# Patient Record
Sex: FEMALE | Race: BLACK OR AFRICAN AMERICAN | Employment: OTHER | URBAN - METROPOLITAN AREA
[De-identification: names, ages, dates, MRNs, and addresses within clinical notes are randomized per-mention and may not be internally consistent; named-entity substitution may affect disease eponyms.]

---

## 2017-02-02 ENCOUNTER — DOCUMENTATION ONLY (OUTPATIENT)
Dept: INTERNAL MEDICINE CLINIC | Age: 72
End: 2017-02-02

## 2017-05-08 ENCOUNTER — OFFICE VISIT (OUTPATIENT)
Dept: INTERNAL MEDICINE CLINIC | Age: 72
End: 2017-05-08

## 2017-05-08 ENCOUNTER — HOSPITAL ENCOUNTER (OUTPATIENT)
Dept: LAB | Age: 72
Discharge: HOME | End: 2017-05-08
Payer: MEDICARE

## 2017-05-08 VITALS
OXYGEN SATURATION: 89 % | RESPIRATION RATE: 14 BRPM | TEMPERATURE: 97.7 F | BODY MASS INDEX: 25.62 KG/M2 | HEIGHT: 62 IN | HEART RATE: 51 BPM | DIASTOLIC BLOOD PRESSURE: 51 MMHG | SYSTOLIC BLOOD PRESSURE: 104 MMHG | WEIGHT: 139.2 LBS

## 2017-05-08 DIAGNOSIS — Z00.00 MEDICARE ANNUAL WELLNESS VISIT, SUBSEQUENT: Primary | ICD-10-CM

## 2017-05-08 DIAGNOSIS — K21.9 GASTROESOPHAGEAL REFLUX DISEASE WITHOUT ESOPHAGITIS: ICD-10-CM

## 2017-05-08 DIAGNOSIS — Z13.39 SCREENING FOR ALCOHOLISM: ICD-10-CM

## 2017-05-08 DIAGNOSIS — C90.01 MULTIPLE MYELOMA IN REMISSION (HCC): Chronic | ICD-10-CM

## 2017-05-08 DIAGNOSIS — E78.9 LIPID DISORDER: ICD-10-CM

## 2017-05-08 DIAGNOSIS — Z12.11 SCREENING FOR COLON CANCER: ICD-10-CM

## 2017-05-08 PROCEDURE — 80053 COMPREHEN METABOLIC PANEL: CPT

## 2017-05-08 PROCEDURE — 80061 LIPID PANEL: CPT

## 2017-05-08 PROCEDURE — 36415 COLL VENOUS BLD VENIPUNCTURE: CPT

## 2017-05-08 RX ORDER — HYDROGEN PEROXIDE 3 %
SOLUTION, NON-ORAL MISCELLANEOUS
Refills: 11 | COMMUNITY
Start: 2017-03-30 | End: 2018-01-09

## 2017-05-08 NOTE — PROGRESS NOTES
HISTORY OF PRESENT ILLNESS  Stefania Estrella is a 80 y.o. female. HPI     Multiple Myeloma- pt states she was not able to handle Revlimid 15 mg and her dose was decreased to 10 mg and she was not able to tolerate this either. Pt is now taking Revlimid 5 mg- she does a cycle where she takes Revlimid for a few weeks and then holds it for a week. Reports her neutrophil count is down now and she is doing a cycle- she will recheck her neutrophil count before she starts another cycle. Pt states they want her neutrophil count at 50. Pt saw Dr. Krish Constantino on 5/4/17. She will follow up in 10/2016 at [facility] with Dr. Ross De Jesus for immunizations. Pt states she has to get a lot of vaccines at the same time and this knocks her out. Pt will get the flu shot prior to this. Reports she has had pain and then blood vessels pop in her eyes and she thinks this may be correlated with Revlimid. Reports heart rate is low due to Revlimid and this medication makes her tired. Reports at her last follow up with Dr. Ross De Jesus she was told there was no trace of multiple myeloma and per  Dr. Krish Constantino said this as well. GERD- she continues to take Nexium. Pt used to go to [place] for colonoscopies although she does not feel up to this trip and requests name of GI. She worries about germs with being in a hotel room. Mood- reports her  has seen Donna Camara twice and she went once. Pt states  does not communicate and this bothers her. They are active with working in the yard. She denies stomach issues. Review of Systems   All other systems reviewed and are negative. Physical Exam   Constitutional: She is oriented to person, place, and time. She appears well-developed and well-nourished. HENT:   Head: Normocephalic and atraumatic.    Right Ear: External ear normal.   Left Ear: External ear normal.   Nose: Nose normal.   Mouth/Throat: Oropharynx is clear and moist.   Eyes: Conjunctivae and EOM are normal.   Neck: Normal range of motion. Neck supple. Carotid bruit is not present. No thyroid mass and no thyromegaly present. Cardiovascular: Normal rate, regular rhythm, S1 normal, S2 normal, normal heart sounds and intact distal pulses. Pulmonary/Chest: Effort normal and breath sounds normal.   Abdominal: Soft. Normal appearance and bowel sounds are normal. There is no hepatosplenomegaly. There is no tenderness. Musculoskeletal: Normal range of motion. Neurological: She is alert and oriented to person, place, and time. She has normal strength. No cranial nerve deficit or sensory deficit. Coordination normal.   Skin: Skin is warm, dry and intact. No abrasion and no rash noted. Psychiatric: She has a normal mood and affect. Her behavior is normal. Judgment and thought content normal.   Nursing note and vitals reviewed. ASSESSMENT and Jean-Pierre Noguera was seen today for annual wellness visit. Diagnoses and all orders for this visit:    Medicare annual wellness visit, subsequent    Multiple myeloma in remission ([facility] [address]  Stable- continue taking Revlimid and followed by Dr. Adalid Carter. Gastroesophageal reflux disease without esophagitis  Stable- Continue current medications. Screening for alcoholism    Lipid disorder  -     METABOLIC PANEL, COMPREHENSIVE  -     LIPID PANEL    Screening for colon cancer  I provided pt with Dr. Sherrie Figueroa contact information.   -     REFERRAL TO GASTROENTEROLOGY    She has labs with Dr. Adalid Carter. lab results and schedule of future lab studies reviewed with patient  reviewed diet, exercise and weight control  reviewed medications and side effects in detail     Written by Vanna Abbott, as dictated by Radha Pratt MD.     Current diagnosis and concerns discussed with pt at length. Understands risks and benefits or current treatment plan and medications and accepts the treatment and medication with any possible risks. Pt asks appropriate questions which were answered.  Pt instructed to call with any concerns or problems.

## 2017-05-08 NOTE — ACP (ADVANCE CARE PLANNING)
Patient states that completed AMD at home. I encouraged patient to bring a copy to the office for scanning into the medical record. Patient verbalized understanding.

## 2017-05-08 NOTE — PROGRESS NOTES
This is a Subsequent Medicare Annual Wellness Visit providing Personalized Prevention Plan Services (PPPS) (Performed 12 months after initial AWV and PPPS )    I have reviewed the patient's medical history in detail and updated the computerized patient record. History     Past Medical History:   Diagnosis Date    GERD (gastroesophageal reflux disease) 6/2/2009    Multiple myeloma     Multiple myeloma in relapse ([facility] [address] 6/2/2009    Multiple myeloma in remission ([facility] [address] 3/17/2011    Multiple myeloma in remission ([facility] [address] 3/17/2011    Osteoarthritis     Tendonitis       Past Surgical History:   Procedure Laterality Date    HX GYN       Current Outpatient Prescriptions   Medication Sig Dispense Refill    esomeprazole (NEXIUM) 20 mg capsule TAKE ONE CAPSULE TWO TIMES A DAY  11    REVLIMID 25 mg cap 25 mg once over twenty-four (24) hours. Once daily for 14 doses and stop for 1 week and restart cycle again. Total of 5 cycles.  acyclovir (ZOVIRAX) 400 mg tablet 400 mg two (2) times a day. 5    estradiol (ESTRACE) 0.01 % (0.1 mg/gram) vaginal cream Insert 2 g into vagina daily.  aspirin delayed-release 81 mg tablet Take 162 mg by mouth daily.  ERGOCALCIFEROL, VITAMIN D2, (VITAMIN D PO) Take 400 Units by mouth daily.  CALCIUM CARBONATE/VITAMIN D3 (CALCIUM + D PO) Take 1,200 mg by mouth daily.  omeprazole (PRILOSEC) 20 mg capsule TAKE ONE CAPSULE BY MOUTH TWICE A DAY 60 Cap 4    omeprazole (PRILOSEC) 20 mg capsule TAKE ONE CAPSULE BY MOUTH TWICE A DAY 60 Cap 4    omeprazole (PRILOSEC) 20 mg capsule Take 1 Cap by mouth two (2) times a day for 30 days. 60 Cap 3    ranitidine (ZANTAC) 150 mg tablet Take 1 Tab by mouth two (2) times a day. 20 Tab 0    dexamethasone (DECADRON) 4 mg tablet 40 mg every seven (7) days. Once weekly, taking on Monday's. 5    ondansetron hcl (ZOFRAN) 8 mg tablet as needed. 1    BORTEZOMIB (VELCADE IJ) 2.3 mg by Injection route every seven (7) days.  Taking every Monday.  COQ10, LIPOSOMAL UBIQUINOL, PO Take 100 mg by mouth daily.  B.infantis-B.ani-B.long-B.bifi (PROBIOTIC 4X) 10-15 mg TbEC Take  by mouth daily.  PV W-O BIBI/FERROUS FUMARATE/FA (M-VIT PO) Take  by mouth.  OMEPRAZOLE/SODIUM BICARBONATE (ZEGERID PO) Take  by mouth daily. Allergies   Allergen Reactions    Ciprofloxacin Other (comments)     Tendinitis.  Keflex [Cephalexin] Rash    Levaquin [Levofloxacin] Other (comments)     Tendinitis. Family History   Problem Relation Age of Onset   [facility] Cancer Mother     Hypertension Sister     Thyroid Disease Sister     Hypertension Brother      Social History   Substance Use Topics    Smoking status: Never Smoker    Smokeless tobacco: Never Used    Alcohol use No     Patient Active Problem List   Diagnosis Code    GERD (gastroesophageal reflux disease) K21.9    Multiple myeloma in remission ([facility] [address] C90.01    Advanced care planning/counseling discussion Z71.89       Depression Risk Factor Screening:     PHQ over the last two weeks 12/8/2015   Little interest or pleasure in doing things Not at all   Feeling down, depressed or hopeless Several days   Total Score PHQ 2 1     Alcohol Risk Factor Screening: On any occasion during the past 3 months, have you had more than 3 drinks containing alcohol? No    Do you average more than 7 drinks per week? No      Functional Ability and Level of Safety:     Hearing Loss   normal-to-mild    Activities of Daily Living   Self-care. Requires assistance with: no ADLs    Fall Risk     Fall Risk Assessment, last 12 mths 12/8/2015   Able to walk? Yes   Fall in past 12 months? No     Abuse Screen   Patient is not abused    Review of Systems   A comprehensive review of systems was negative except for that written in the HPI.     Physical Examination     Evaluation of Cognitive Function:  Mood/affect:  neutral  Appearance: age appropriate  Family member/caregiver input: has been doing well        Patient Care Team:  Juan Linda MD as PCP - Elliot Marquez MD (Hematology and Oncology)  Paradise Mccormick MD (Ophthalmology)    Advice/Referrals/Counseling   Education and counseling provided:  Are appropriate based on today's review and evaluation  End-of-Life planning (with patient's consent)      Assessment/Plan   Barb Molina was seen today for annual wellness visit. Diagnoses and all orders for this visit:    Medicare annual wellness visit, subsequent    Multiple myeloma in remission ([facility] [address]    Gastroesophageal reflux disease without esophagitis    Screening for alcoholism    Lipid disorder  -     METABOLIC PANEL, COMPREHENSIVE  -     LIPID PANEL    Screening for colon cancer  -     REFERRAL TO GASTROENTEROLOGY      lab results and schedule of future lab studies reviewed with patient  reviewed diet, exercise and weight control  cardiovascular risk and specific lipid/LDL goals reviewed  reviewed medications and side effects in detail.

## 2017-05-08 NOTE — MR AVS SNAPSHOT
Visit Information Date & Time Provider Department Dept. Phone Encounter #  
 5/8/2017  7:45 AM Joanne Gee MD [facility] [address] [id number] Upcoming Health Maintenance Date Due Pneumococcal 65+ High/Highest Risk (2 of 2 - PPSV23) 7/3/2017 INFLUENZA AGE 9 TO ADULT 8/1/2017 GLAUCOMA SCREENING Q2Y 12/15/2017 MEDICARE YEARLY EXAM 5/9/2018 BREAST CANCER SCRN MAMMOGRAM 12/27/2018 COLONOSCOPY 5/2/2022 DTaP/Tdap/Td series (2 - Td) 5/8/2027 Allergies as of 5/8/2017  Review Complete On: 5/8/2017 By: Radha Landeros LPN Severity Noted Reaction Type Reactions Ciprofloxacin  01/21/2015    Other (comments) Tendinitis. Keflex [Cephalexin]  02/02/2009    Rash Levaquin [Levofloxacin]  01/21/2015    Other (comments) Tendinitis. Current Immunizations  Reviewed on 5/8/2017 Name Date Influenza Vaccine 10/5/2015, 10/7/2013 Influenza Vaccine Whole 9/26/2011 Reviewed by Joanne Gee MD on 5/8/2017 at  7:59 AM  
You Were Diagnosed With   
  
 Codes Comments Medicare annual wellness visit, subsequent    -  Primary ICD-10-CM: Z00.00 ICD-9-CM: V70.0 Multiple myeloma in remission ([facility] [address]     ICD-10-CM: C90.01 
ICD-9-CM: 203.01 Gastroesophageal reflux disease without esophagitis     ICD-10-CM: K21.9 ICD-9-CM: 530.81 Screening for alcoholism     ICD-10-CM: Z13.89 ICD-9-CM: V79.1 Lipid disorder     ICD-10-CM: E78.9 ICD-9-CM: 272.9 Screening for colon cancer     ICD-10-CM: Z12.11 ICD-9-CM: V76.51 Vitals BP Pulse Temp Resp Height(growth percentile) Weight(growth percentile) 104/51 (BP 1 Location: Left arm, BP Patient Position: Sitting) (!) 51 97.7 °F (36.5 °C) (Oral) 14 5' 2\" (1.575 m) 139 lb 3.2 oz (63.1 kg) SpO2 BMI OB Status Smoking Status (!) 89% 25.46 kg/m2 Hysterectomy Never Smoker Vitals History BMI and BSA Data Body Mass Index Body Surface Area  
 25.46 kg/m 2 1.66 m 2 Preferred Pharmacy Pharmacy Name Phone [address] [facility], [address] [facility] Gauri Romero [phone] Your Updated Medication List  
  
   
This list is accurate as of: 5/8/17 11:59 PM.  Always use your most recent med list.  
  
  
  
  
 acyclovir 400 mg tablet Commonly known as:  ZOVIRAX  
400 mg two (2) times a day. aspirin delayed-release 81 mg tablet Take 162 mg by mouth daily. CALCIUM + D PO Take 1,200 mg by mouth daily. esomeprazole 20 mg capsule Commonly known as:  NEXIUM  
TAKE ONE CAPSULE TWO TIMES A DAY  
  
 estradiol 0.01 % (0.1 mg/gram) vaginal cream  
Commonly known as:  ESTRACE Insert 2 g into vagina daily. omeprazole 20 mg capsule Commonly known as:  PRILOSEC Take 1 Cap by mouth two (2) times a day for 30 days. REVLIMID 25 mg Cap Generic drug:  lenalidomide 25 mg once over twenty-four (24) hours. Once daily for 14 doses and stop for 1 week and restart cycle again. Total of 5 cycles. VITAMIN D2 PO Take 400 Units by mouth daily. We Performed the Following CVD REPORT [[id number] Custom] LIPID PANEL [99305 CPT(R)] METABOLIC PANEL, COMPREHENSIVE [33441 CPT(R)] REFERRAL TO GASTROENTEROLOGY [QJU36 Custom] Referral Information Referral ID Referred By Referred To  
  
 [id number] Santiago PRESTON   
   [address] [place] [address] [address] [place] [address], [address] [facility] Visits Status Start Date End Date 1 New Request 5/8/17 5/8/18 If your referral has a status of pending review or denied, additional information will be sent to support the outcome of this decision. Introducing [facility]! Dear Debora Chowdary: Thank you for requesting a [employer] account. Our records indicate that you already have an active [employer] account.   You can access your account anytime at [email or web address] Did you know that you can access your hospital and ER discharge instructions at any time in [employer]? You can also review all of your test results from your hospital stay or ER visit. Additional Information If you have questions, please visit the Frequently Asked Questions section of the [employer] website at [email or web address] Remember, [employer] is NOT to be used for urgent needs. For medical emergencies, dial 911. Now available from your iPhone and Android! Please provide this summary of care documentation to your next provider. Your primary care clinician is listed as Rachel Owens. If you have any questions after today's visit, please call [phone].

## 2017-05-09 LAB
ALBUMIN SERPL-MCNC: 4.2 G/DL (ref 3.5–4.8)
ALBUMIN/GLOB SERPL: 1.8 {RATIO} (ref 1.2–2.2)
ALP SERPL-CCNC: 37 IU/L (ref 39–117)
ALT SERPL-CCNC: 28 IU/L (ref 0–32)
AST SERPL-CCNC: 26 IU/L (ref 0–40)
BILIRUB SERPL-MCNC: 0.5 MG/DL (ref 0–1.2)
BUN SERPL-MCNC: 13 MG/DL (ref 8–27)
BUN/CREAT SERPL: 20 (ref 12–28)
CALCIUM SERPL-MCNC: 9.7 MG/DL (ref 8.7–10.3)
CHLORIDE SERPL-SCNC: 100 MMOL/L (ref 96–106)
CHOLEST SERPL-MCNC: 195 MG/DL (ref 100–199)
CO2 SERPL-SCNC: 29 MMOL/L (ref 18–29)
CREAT SERPL-MCNC: 0.64 MG/DL (ref 0.57–1)
GLOBULIN SER CALC-MCNC: 2.4 G/DL (ref 1.5–4.5)
GLUCOSE SERPL-MCNC: 74 MG/DL (ref 65–99)
HDLC SERPL-MCNC: 95 MG/DL
INTERPRETATION, 910389: NORMAL
LDLC SERPL CALC-MCNC: 84 MG/DL (ref 0–99)
POTASSIUM SERPL-SCNC: 4.3 MMOL/L (ref 3.5–5.2)
PROT SERPL-MCNC: 6.6 G/DL (ref 6–8.5)
SODIUM SERPL-SCNC: 143 MMOL/L (ref 134–144)
TRIGL SERPL-MCNC: 82 MG/DL (ref 0–149)
VLDLC SERPL CALC-MCNC: 16 MG/DL (ref 5–40)

## 2017-10-06 ENCOUNTER — OFFICE VISIT (OUTPATIENT)
Dept: INTERNAL MEDICINE CLINIC | Age: 72
End: 2017-10-06

## 2017-10-06 VITALS
SYSTOLIC BLOOD PRESSURE: 108 MMHG | OXYGEN SATURATION: 98 % | BODY MASS INDEX: 26.68 KG/M2 | TEMPERATURE: 98 F | HEIGHT: 62 IN | HEART RATE: 53 BPM | RESPIRATION RATE: 18 BRPM | DIASTOLIC BLOOD PRESSURE: 45 MMHG | WEIGHT: 145 LBS

## 2017-10-06 DIAGNOSIS — N64.4 BREAST PAIN, RIGHT: Primary | ICD-10-CM

## 2017-10-06 DIAGNOSIS — C90.01 MULTIPLE MYELOMA IN REMISSION (HCC): Chronic | ICD-10-CM

## 2017-10-06 RX ORDER — PYRIDOXINE HCL (VITAMIN B6) 100 MG
100 TABLET ORAL 2 TIMES DAILY
COMMUNITY

## 2017-10-06 NOTE — PROGRESS NOTES
Pt here c/o intermittent bilateral breast pain x 1 month. Reports pain is worse in right breast.  States she has a stinging sensation as well.

## 2017-10-06 NOTE — PROGRESS NOTES
HISTORY OF PRESENT ILLNESS  Nita Lee is a 80 y.o. female. HPI   Patient reports breast pain in both breasts. She believes it may be a problem with her port. Patient states it feels like a burning pain around nipple area. MM- she is taking the revalid consistently and going to be seeing  on Monday and going to [facility] in two weeks  Review of Systems   All other systems reviewed and are negative. Physical Exam   Constitutional: She is oriented to person, place, and time. She appears well-developed and well-nourished. HENT:   Head: Normocephalic and atraumatic. Right Ear: External ear normal.   Left Ear: External ear normal.   Nose: Nose normal.   Mouth/Throat: Oropharynx is clear and moist.   Eyes: Conjunctivae and EOM are normal.   Neck: Normal range of motion. Neck supple. Carotid bruit is not present. No thyroid mass and no thyromegaly present. Cardiovascular: Normal rate, regular rhythm, S1 normal, S2 normal, normal heart sounds and intact distal pulses. Pulmonary/Chest: Effort normal and breath sounds normal.   Abdominal: Soft. Normal appearance and bowel sounds are normal. There is no hepatosplenomegaly. There is no tenderness. Musculoskeletal: Normal range of motion. Neurological: She is alert and oriented to person, place, and time. She has normal strength. No cranial nerve deficit or sensory deficit. Coordination normal.   Skin: Skin is warm, dry and intact. No abrasion and no rash noted. Psychiatric: She has a normal mood and affect. Her behavior is normal. Judgment and thought content normal.   Nursing note and vitals reviewed. ASSESSMENT and PLAN  Diagnoses and all orders for this visit:    1. Breast pain, right  Do not suspect pain related to port, because lack of swelling or redness. Do not feel masses or nodes and no drainage. May be reaction to medications. Patient will follow up with Dr. All Allen and get her opinion.  If she thinks she needs mammogram we will do it!    2. Multiple myeloma in remission ([facility] [address]  Follow up with Dr. Anju Huerta and U. Continue on current medications. lab results and schedule of future lab studies reviewed with patient  reviewed diet, exercise and weight control    Written by Guillermo Macias, as dictated by Armen Ramirez MD.     Current diagnosis and concerns discussed with pt at length. Understands risks and benefits or current treatment plan and medications and accepts the treatment and medication with any possible risks. Pt asks appropriate questions which were answered. Pt instructed to call with any concerns or problems.

## 2017-10-11 ENCOUNTER — TELEPHONE (OUTPATIENT)
Dept: INTERNAL MEDICINE CLINIC | Age: 72
End: 2017-10-11

## 2017-10-11 NOTE — TELEPHONE ENCOUNTER
Patient  Is having a colonoscopy and needs records faxed to Dr. Marybeth Runner office.   [phone]

## 2018-01-08 ENCOUNTER — HOSPITAL ENCOUNTER (OUTPATIENT)
Dept: MAMMOGRAPHY | Age: 73
Discharge: HOME | End: 2018-01-08
Attending: INTERNAL MEDICINE
Payer: MEDICARE

## 2018-01-08 DIAGNOSIS — Z12.39 SCREENING BREAST EXAMINATION: ICD-10-CM

## 2018-01-08 PROCEDURE — 77067 SCR MAMMO BI INCL CAD: CPT

## 2018-01-09 RX ORDER — GUAIFENESIN 100 MG/5ML
81 LIQUID (ML) ORAL DAILY
COMMUNITY

## 2018-01-12 ENCOUNTER — ANESTHESIA EVENT (OUTPATIENT)
Dept: ENDOSCOPY | Age: 73
End: 2018-01-12
Payer: MEDICARE

## 2018-01-12 ENCOUNTER — ANESTHESIA (OUTPATIENT)
Dept: ENDOSCOPY | Age: 73
End: 2018-01-12
Payer: MEDICARE

## 2018-01-12 ENCOUNTER — HOSPITAL ENCOUNTER (OUTPATIENT)
Age: 73
Setting detail: OUTPATIENT SURGERY
Discharge: HOME | End: 2018-01-12
Attending: INTERNAL MEDICINE | Admitting: INTERNAL MEDICINE
Payer: MEDICARE

## 2018-01-12 VITALS
RESPIRATION RATE: 18 BRPM | HEIGHT: 62 IN | BODY MASS INDEX: 26.68 KG/M2 | TEMPERATURE: 97.5 F | HEART RATE: 60 BPM | OXYGEN SATURATION: 100 % | SYSTOLIC BLOOD PRESSURE: 116 MMHG | WEIGHT: 145 LBS | DIASTOLIC BLOOD PRESSURE: 55 MMHG

## 2018-01-12 PROCEDURE — 74011250636 HC RX REV CODE- 250/636

## 2018-01-12 PROCEDURE — 76060000031 HC ANESTHESIA FIRST 0.5 HR: Performed by: INTERNAL MEDICINE

## 2018-01-12 PROCEDURE — 76040000019: Performed by: INTERNAL MEDICINE

## 2018-01-12 PROCEDURE — 74011250636 HC RX REV CODE- 250/636: Performed by: INTERNAL MEDICINE

## 2018-01-12 PROCEDURE — 74011000250 HC RX REV CODE- 250

## 2018-01-12 RX ORDER — ATROPINE SULFATE 0.1 MG/ML
0.4 INJECTION INTRAVENOUS
Status: DISCONTINUED | OUTPATIENT
Start: 2018-01-12 | End: 2018-01-12 | Stop reason: HOSPADM

## 2018-01-12 RX ORDER — PROPOFOL 10 MG/ML
INJECTION, EMULSION INTRAVENOUS
Status: DISCONTINUED | OUTPATIENT
Start: 2018-01-12 | End: 2018-01-12 | Stop reason: HOSPADM

## 2018-01-12 RX ORDER — FLUMAZENIL 0.1 MG/ML
0.2 INJECTION INTRAVENOUS
Status: DISCONTINUED | OUTPATIENT
Start: 2018-01-12 | End: 2018-01-12 | Stop reason: HOSPADM

## 2018-01-12 RX ORDER — EPINEPHRINE 0.1 MG/ML
1 INJECTION INTRACARDIAC; INTRAVENOUS
Status: DISCONTINUED | OUTPATIENT
Start: 2018-01-12 | End: 2018-01-12 | Stop reason: HOSPADM

## 2018-01-12 RX ORDER — SODIUM CHLORIDE 9 MG/ML
50 INJECTION, SOLUTION INTRAVENOUS CONTINUOUS
Status: DISCONTINUED | OUTPATIENT
Start: 2018-01-12 | End: 2018-01-12 | Stop reason: HOSPADM

## 2018-01-12 RX ORDER — LIDOCAINE HYDROCHLORIDE 20 MG/ML
INJECTION, SOLUTION EPIDURAL; INFILTRATION; INTRACAUDAL; PERINEURAL AS NEEDED
Status: DISCONTINUED | OUTPATIENT
Start: 2018-01-12 | End: 2018-01-12 | Stop reason: HOSPADM

## 2018-01-12 RX ORDER — PROPOFOL 10 MG/ML
INJECTION, EMULSION INTRAVENOUS AS NEEDED
Status: DISCONTINUED | OUTPATIENT
Start: 2018-01-12 | End: 2018-01-12 | Stop reason: HOSPADM

## 2018-01-12 RX ORDER — MIDAZOLAM HYDROCHLORIDE 1 MG/ML
.25-5 INJECTION, SOLUTION INTRAMUSCULAR; INTRAVENOUS
Status: DISCONTINUED | OUTPATIENT
Start: 2018-01-12 | End: 2018-01-12 | Stop reason: HOSPADM

## 2018-01-12 RX ORDER — HYDROGEN PEROXIDE 3 %
20 SOLUTION, NON-ORAL MISCELLANEOUS DAILY
COMMUNITY
End: 2019-05-15 | Stop reason: SDUPTHER

## 2018-01-12 RX ORDER — NALOXONE HYDROCHLORIDE 0.4 MG/ML
0.4 INJECTION, SOLUTION INTRAMUSCULAR; INTRAVENOUS; SUBCUTANEOUS
Status: DISCONTINUED | OUTPATIENT
Start: 2018-01-12 | End: 2018-01-12 | Stop reason: HOSPADM

## 2018-01-12 RX ORDER — DEXTROMETHORPHAN/PSEUDOEPHED 2.5-7.5/.8
1.2 DROPS ORAL
Status: DISCONTINUED | OUTPATIENT
Start: 2018-01-12 | End: 2018-01-12 | Stop reason: HOSPADM

## 2018-01-12 RX ADMIN — LIDOCAINE HYDROCHLORIDE 30 MG: 20 INJECTION, SOLUTION EPIDURAL; INFILTRATION; INTRACAUDAL; PERINEURAL at 08:28

## 2018-01-12 RX ADMIN — SODIUM CHLORIDE 50 ML/HR: 900 INJECTION, SOLUTION INTRAVENOUS at 07:13

## 2018-01-12 RX ADMIN — PROPOFOL 50 MG: 10 INJECTION, EMULSION INTRAVENOUS at 08:31

## 2018-01-12 RX ADMIN — PROPOFOL 100 MCG/KG/MIN: 10 INJECTION, EMULSION INTRAVENOUS at 08:27

## 2018-01-12 RX ADMIN — PROPOFOL 50 MG: 10 INJECTION, EMULSION INTRAVENOUS at 08:36

## 2018-01-12 RX ADMIN — PROPOFOL 120 MG: 10 INJECTION, EMULSION INTRAVENOUS at 08:28

## 2018-01-12 NOTE — PROCEDURES
Matt Munguia M.D.  [phone]           2018                EGD Operative Report  Ronnie Sparrow  :  1945  Zander Orozco Medical Record Number:  [id number]      Indication:  GERD     : Kurtis Chavez MD    Referring Provider:  Charlene Kearney MD      Anesthesia/Sedation:  MAC anesthesia    Airway assessment: No airway problems anticipated    Pre-Procedural Exam:      Airway: clear, no airway problems anticipated  Heart: RRR, without gallops or rubs  Lungs: clear bilaterally without wheezes, crackles, or rhonchi  Abdomen: soft, nontender, nondistended, bowel sounds present  Mental Status: awake, alert and oriented to person, place and time       Procedure Details     After infomed consent was obtained for the procedure, with all risks and benefits of procedure explained the patient was taken to the endoscopy suite and placed in the left lateral decubitus position. Following sequential administration of sedation as per above, the endoscope was inserted into the mouth and advanced under direct vision to second portion of the duodenum. A careful inspection was made as the gastroscope was withdrawn, including a retroflexed view of the proximal stomach; findings and interventions are described below. Findings:   Esophagus:normal  Stomach: normal   Duodenum/jejunum: normal    Therapies:  none    Specimens: none           Complications:   None; patient tolerated the procedure well. EBL:  None. Impression:    Upper endoscopy within normal.    Recommendations:    -Continue acid suppression. -GERD diet: avoid fried and fatty foods. peppermint, chocolate, alcohol, coffee, citrus fruits and juices, tomoato products; avoid lying down for 2 to 3 hours after eating.     Kutris Chavez MD

## 2018-01-12 NOTE — ANESTHESIA POSTPROCEDURE EVALUATION
Post-Anesthesia Evaluation and Assessment    Patient: Yudelka Lemus MRN: [id number]  SSN: [id number]    YOB: 1945  Age: 71 y.o. Sex: female       Cardiovascular Function/Vital Signs  Visit Vitals    /55    Pulse 60    Temp 36.4 °C (97.5 °F)    Resp 18    Ht 5' 2\" (1.575 m)    Wt 65.8 kg (145 lb)    SpO2 100%    Breastfeeding No    BMI 26.52 kg/m2       Patient is status post MAC anesthesia for Procedure(s):  COLONOSCOPY  ESOPHAGOGASTRODUODENOSCOPY (EGD). Nausea/Vomiting: None    Postoperative hydration reviewed and adequate. Pain:  Pain Scale 1: Numeric (0 - 10) (01/12/18 0911)  Pain Intensity 1: 0 (01/12/18 0911)   Managed    Neurological Status: At baseline    Mental Status and Level of Consciousness: Arousable    Pulmonary Status:   O2 Device: Room air (01/12/18 0911)   Adequate oxygenation and airway patent    Complications related to anesthesia: None    Post-anesthesia assessment completed.  No concerns    Signed By: Neo Adams MD     January 12, 2018

## 2018-01-12 NOTE — PROGRESS NOTES
Assumed care of patient from anesthesia. Endoscope was pre-cleaned at bedside immediately following procedure by Sania Solorzano. Patient held temporarily in procedure room due to limited RR space.

## 2018-01-12 NOTE — IP AVS SNAPSHOT
[address] 
[phone] Patient: Nayeli Carroll MRN: [id number] :1945 About your hospitalization You were admitted on:  2018 You last received care in the:  [facility] ENDOSCOPY You were discharged on:  2018 Why you were hospitalized Your primary diagnosis was:  Not on File Follow-up Information None Discharge Orders None A check [place] indicates which time of day the medication should be taken. My Medications CONTINUE taking these medications Instructions Each Dose to Equal  
 Morning Noon Evening Bedtime  
 aspirin 81 mg chewable tablet Your last dose was: Your next dose is: Take 81 mg by mouth daily. 81 mg CALCIUM + D PO Your last dose was: Your next dose is: Take 1,000 mg by mouth two (2) times a day. 1000 mg  
    
   
   
   
  
 esomeprazole 20 mg capsule Commonly known as:  Beverley Grounds Your last dose was: Your next dose is: Take 20 mg by mouth daily. Indications: gastroesophageal reflux disease 20 mg  
    
   
   
   
  
 estradiol 0.01 % (0.1 mg/gram) vaginal cream  
Commonly known as:  ESTRACE Your last dose was: Your next dose is: Insert 2 g into vagina daily. 2 g  
    
   
   
   
  
 omeprazole 20 mg capsule Commonly known as:  PRILOSEC Your last dose was: Your next dose is: Take 1 Cap by mouth two (2) times a day for 30 days. 20 mg REVLIMID 25 mg Cap Generic drug:  lenalidomide Your last dose was: Your next dose is:    
   
   
 5 mg once over twenty-four (24) hours. Once daily for 14 doses and stop for 1 week and restart cycle again. Total of 5 cycles. 5 mg VITAMIN B-6 100 mg tablet Generic drug:  pyridoxine (vitamin B6) Your last dose was: Your next dose is: Take 100 mg by mouth two (2) times a day. 100 mg Discharge Instructions [facility]. [facility] Elie Sahu M.D. 
[phone] COLON and EGD DISCHARGE INSTRUCTIONS 
 
2018 Clifton Verdugo :  1945 John Medical Record Number:  [id number] DISCOMFORT: 
Sore throat- throat lozenges or warm salt water gargle Redness at IV site- apply warm compress to area; if redness or soreness persist- contact your physician There may be a slight amount of blood passed from the rectum Gaseous discomfort- walking, belching will help relieve any discomfort You may not operate a vehicle for 12 hours You may not engage in an occupation involving machinery or appliances for rest of today You may not drink alcoholic beverages for at least 12 hours Avoid making any critical decisions for at least 24 hour DIET: 
 High fiber diet.  however -  remember your colon is empty and a heavy meal will produce gas. Avoid these foods:  vegetables, fried / greasy foods, carbonated drinks for today ACTIVITY: 
You may  resume your normal daily activities it is recommended that you spend the remainder of the day resting -  avoid any strenuous activity and driving. CALL M.D. ANY SIGN OF: Increasing pain, nausea, vomiting Abdominal distension (swelling) New increased bleeding (oral or rectal) Fever (chills) Pain in chest area Bloody discharge from nose or mouth Shortness of breath Follow-up Instructions: 
 Call Dr. Xiomy Verdin if any questions at [phone]. Results of procedure / biopsy in 7 to 10 days, we will call you with these results. Your endoscopy showed normal mucosa throughout. Your colonoscopy showed normal mucosa throughout the colon. ACO Transitions of Care Introducing [employer] [address] offers a voluntary care coordination program to provide high quality service and care to [facility] fee-for-service beneficiaries. Lupe Almonte was designed to help you enhance your health and well-being through the following services: ? Transitions of Care  support for individuals who are transitioning from one care setting to another (example: Hospital to home). ? Chronic and Complex Care Coordination  support for individuals and caregivers of those with serious or chronic illnesses or with more than one chronic (ongoing) condition and those who take a number of different medications. If you meet specific medical criteria, a [address] [place] [address] Rd may call you directly to coordinate your care with your primary care physician and your other care providers. For questions about the [facility] programs, please, contact your physicians office. For general questions or additional information about Accountable Care Organizations: 
Please visit www.medicare.gov/acos. html or call 1-800-MEDICARE (9-645.261.8439) TTY users should call 1-220.649.1334. Introducing [facility] & HEALTH SERVICES! Dear Fernie Mathews: Thank you for requesting a [email or web address] account. Our records indicate that you already have an active [email or web address] account. You can access your account anytime at [email or web address] Did you know that you can access your hospital and ER discharge instructions at any time in [email or web address]? You can also review all of your test results from your hospital stay or ER visit. Additional Information If you have questions, please visit the Frequently Asked Questions section of the [email or web address] website at [email or web address] Remember, [email or web address] is NOT to be used for urgent needs. For medical emergencies, dial 911. Now available from your iPhone and Android! Providers Seen During Your Hospitalization Provider Specialty Primary office phone aDvid Cohen MD Gastroenterology [phone] Your Primary Care Physician (PCP) Primary Care Physician Office Phone Office Sherry PERRY [address] [phone] You are allergic to the following Allergen Reactions Ciprofloxacin Other (comments) Tendonitis. Keflex (Cephalexin) Rash Levaquin (Levofloxacin) Other (comments) Tendinitis. Recent Documentation Height Weight Breastfeeding? BMI OB Status Smoking Status 1.575 m 65.8 kg No 26.52 kg/m2 Hysterectomy Never Smoker Emergency Contacts Name Discharge Info Relation Home Work Mobile Devon Dwyer DISCHARGE CAREGIVER [3] Spouse [3] [phone] Patient Belongings The following personal items are in your possession at time of discharge: 
  Dental Appliances: None  Visual Aid: Glasses, At bedside Please provide this summary of care documentation to your next provider. Signatures-by signing, you are acknowledging that this After Visit Summary has been reviewed with you and you have received a copy. Patient Signature:  ____________________________________________________________ Date:  ____________________________________________________________  
  
[facility] Provider Signature:  ____________________________________________________________ Date:  ____________________________________________________________

## 2018-01-12 NOTE — PROGRESS NOTES
Patient resting comfortably on stretcher HOB up VSS call bell within reach  at bedside awaiting MD for procedure will continue to monitor pt.

## 2018-01-12 NOTE — PROGRESS NOTES
Patient resting comfortably on stretcher, HOB up VSS,call bell within reach,  in waiting area and informed of procedure time and/or delays,awaiting procedure start, will continue to monitor pt.

## 2018-01-12 NOTE — ROUTINE PROCESS
Yudelka Allan  1945  [id number]    Situation:  Verbal report received from: Wade Delgado RN  Procedure: Procedure(s):  COLONOSCOPY  ESOPHAGOGASTRODUODENOSCOPY (EGD)    Background:    Preoperative diagnosis: REFLUX, LLQ PAIN  Postoperative diagnosis: Normal EGD Exam  Normal Colon Exam    :  Dr. Kaylie Singh  Assistant(s): Endoscopy Technician-1: Alba Zelaya  Endoscopy RN-1: Katlin Vale RN    Specimens: * No specimens in log *  H. Pylori  no    Assessment:  Intra-procedure medications     Anesthesia gave intra-procedure sedation and medications, see anesthesia flow sheet yes    Intravenous fluids: NS@ KVO     Vital signs stable     Abdominal assessment: round and soft     Recommendation:  Discharge patient per MD order  Family or Friend   Permission to share finding with family or friend yes    Endoscopy discharge instructions have been reviewed and given to patient and spouse. The patient and spouse verbalized understanding and acceptance of instructions.

## 2018-01-12 NOTE — DISCHARGE INSTRUCTIONS
[facility]. Sara Rios M.D.  [phone]                 COLON and EGD DISCHARGE INSTRUCTIONS    2018    Mamta Barton  :  1945  John Medical Record Number:  [id number]      DISCOMFORT:  Sore throat- throat lozenges or warm salt water gargle  Redness at IV site- apply warm compress to area; if redness or soreness persist- contact your physician  There may be a slight amount of blood passed from the rectum  Gaseous discomfort- walking, belching will help relieve any discomfort  You may not operate a vehicle for 12 hours  You may not engage in an occupation involving machinery or appliances for rest of today  You may not drink alcoholic beverages for at least 12 hours  Avoid making any critical decisions for at least 24 hour  DIET:   High fiber diet. - however -  remember your colon is empty and a heavy meal will produce gas. Avoid these foods:  vegetables, fried / greasy foods, carbonated drinks for today     ACTIVITY:  You may  resume your normal daily activities it is recommended that you spend the remainder of the day resting -  avoid any strenuous activity and driving. CALL M.D. ANY SIGN OF:   Increasing pain, nausea, vomiting  Abdominal distension (swelling)  New increased bleeding (oral or rectal)  Fever (chills)  Pain in chest area  Bloody discharge from nose or mouth  Shortness of breath      Follow-up Instructions:   Call Dr. Jose Carlos Clarke if any questions at [phone]. Results of procedure / biopsy in 7 to 10 days, we will call you with these results. Your endoscopy showed normal mucosa throughout. Your colonoscopy showed normal mucosa throughout the colon.

## 2018-01-12 NOTE — PROCEDURES
Tommie Fortune M.D.  [phone]            2018          Colonoscopy Operative Report  Yudelka Lemus  :  1945  John Medical Record Number:  [id number]      Indications:    Screening colonoscopy     :  David Cohen MD    Referring Provider: Joellen Nava MD    Sedation:  MAC anesthesia    Pre-Procedural Exam:      Airway: clear,  No airway problems anticipated  Heart: RRR, without gallops or rubs  Lungs: clear bilaterally without wheezes, crackles, or rhonchi  Abdomen: soft, nontender, nondistended, bowel sounds present  Mental Status: awake, alert and oriented to person, place and time     Procedure Details:  After informed consent was obtained with all risks and benefits of procedure explained and preoperative exam completed, the patient was taken to the endoscopy suite and placed in the left lateral decubitus position. Upon sequential sedation as per above, a digital rectal exam was performed. The Olympus videocolonoscope  was inserted in the rectum and carefully advanced to the cecum, which was identified by the ileocecal valve and appendiceal orifice. The quality of preparation was good. The colonoscope was slowly withdrawn with careful inspection and evaluation between folds. Retroflexion in the rectum was performed. Findings:   Terminal Ileum: not intubated  Cecum: normal  Ascending Colon: normal  Transverse Colon: normal  Descending Colon: normal  Sigmoid: normal  Rectum: normal    Interventions:  none    Specimen Removed:  none    Complications: None. EBL:  None. Impression:  Mucosa within normal throughout the colon    Recommendations:  -Repeat colonoscopy in 10 years   -High fiber diet.    -Resume normal medication(s). Discharge Disposition:  Home in the company of a  when able to ambulate.     David Cohen MD  2018  8:58 AM

## 2018-01-12 NOTE — H&P
The patient is a 80year old female who presents with a complaint of Acid Reflux. The patient presents consultation at the request of Dr. Lizabeth Wiseman). Note for \"Acid Reflux\": She has had GERD for over 30 years and has lactose intolerance which is getting worse over time. She reports sometimes bananas get stuck in her substernal area. She admits to nocturnal regurgitation at times. She is on esomeprazole now and has been on different PPIs in the past. She is on chemo for multiple myeloma and had bone marrown transplant in October 2016. She denies abdominal pain. She was due for EGD last year but could not do it because of the transplant. Her last EGD and colonoscopy were 11/2011. Problem List/Past Medical Verito Essex D. Jose Georges; 12/4/2017 9:25 AM)  Multiple Myeloma    Cell transplant (V42.9  Z94.9)      Past Surgical History Verito Essex D. Jose Georges; 12/4/2017 9:25 AM)  Hysterectomy    Chest Port      Allergies Verito Essex D. Jose Gilmore; 12/4/2017 9:25 AM)  Levaquin *FLUOROQUINOLONES*    Cipro *FLUOROQUINOLONES*    Keflex *CEPHALOSPORINS*      Medication History Genie Essex DLeilani Jose Gilmore; 12/4/2017 9:25 AM)  Aspirin  (81MG Tablet, Oral) Active. Revlimid  (5MG Capsule, Oral) Active. (for 21 days on then week off)  Esomeprazole Magnesium  (20MG Capsule DR, Oral) Active. Calcium  (Oral daily) Specific strength unknown - Active. Vitamin D  (Oral) Specific strength unknown - Active. Vitamin B-6  (Oral BID) Specific strength unknown - Active. Systane  (Ophthalmic QID) Specific strength unknown - Active. Medications Reconciled     Social History ie Essex DLeilani Jose Georges; 12/4/2017 9:25 AM)  Tobacco Use   Never smoker. Alcohol Use   Has never drank. Marital status   . Employment status   N/a. Blood Transfusion   Yes. Diagnostic Studies History ie Essex DLeilani Jose Georges; 12/4/2017 9:25 AM)  Colonoscopy      Health Maintenance History Verito Essex MARIA ISABELLeilani Jose Georges; 12/4/2017 9:25 AM)  Flu Vaccine  [2017]:  Pneumovax  [2017]:        Review of Systems Verito Essex DLeilani  Jose Georges; 12/4/2017 9:25 AM)  General Not Present- Chronic Fatigue, Poor Appetite, Weight Gain and Weight Loss. Skin Not Present- Itching, Rash and Skin Color Changes. HEENT Not Present- Hearing Loss and Vertigo. Respiratory Not Present- Difficulty Breathing and TB exposure. Cardiovascular Not Present- Chest Pain, Use of Antibiotics before Dental Procedures and Use of Blood Thinners. Gastrointestinal Present- See HPI. Musculoskeletal Not Present- Arthritis, Hip Replacement Surgery and Knee Replacement Surgery. Neurological Not Present- Weakness. Psychiatric Not Present- Depression. Endocrine Not Present- Diabetes and Thyroid Problems. Hematology Not Present- Anemia. Vitals Mihir NICOLASLeilani Naomi Carmela; 12/4/2017 9:26 AM)  12/4/2017 9:23 AM  Weight: 147 lb   Height: 62 in   Height was reported by patient. Body Surface Area: 1.68 m²   Body Mass Index: 26.89 kg/m²    Temp.: 97.1° F     BP: 122/78 (Sitting, Left Arm, Standard)              Physical Exam (Kavin Montague MD; 12/4/2017 9:46 AM)  General  Mental Status - Alert. General Appearance - Cooperative, Pleasant, Not in acute distress. Orientation - Oriented X3. Build & Nutrition - Well nourished and Well developed. Integumentary  General Characteristics  Overall examination of the patient's skin reveals - no rashes, no bruises and no spider angiomas. Color - normal coloration of skin. Head and Neck  Neck  Global Assessment - full range of motion and supple, no bruit auscultated on the right, no bruit auscultated on the left, non-tender, no lymphadenopathy. Thyroid  Gland Characteristics - normal size and consistency. Eye  Eyeball - Left - No Exophthalmos. Eyeball - Right - No Exophthalmos. Sclera/Conjunctiva - Left - No Jaundice. Sclera/Conjunctiva - Right - No Jaundice. Chest and Lung Exam  Chest and lung exam reveals  - quiet, even and easy respiratory effort with no use of accessory muscles.   Auscultation  Breath sounds - Normal. Adventitious sounds - No Adventitious sounds. Cardiovascular  Auscultation  Rhythm - Regular, No Tachycardia, No Bradycardia . Heart Sounds - Normal heart sounds , S1 WNL and S2 WNL, No S3, No Summation Gallop. Murmurs & Other Heart Sounds - Auscultation of the heart reveals - No Murmurs. Abdomen  Inspection  Inspection of the abdomen reveals - Non-distended. Palpation/Percussion  Tenderness - Non-Tender. Rebound tenderness - No rebound. Liver - No hepatosplenomegaly. Abdominal Mass Palpable - No masses. Other Characteristics - No Ascites. Organomegally - None. Auscultation  Auscultation of the abdomen reveals - Bowel sounds normal, No Abdominal bruits and No Succussion splash. Rectal - Did not examine. Peripheral Vascular  Upper Extremity  Inspection - Left - Normal - No Clubbing, No Cyanosis, No Edema, Pulses Intact. Right - Normal - No Clubbing, No Cyanosis, No Edema, Pulses Intact. Palpation - Edema - Left - No edema. Right - No edema. Lower Extremity  Inspection - Left - Inspection Normal. Right - Inspection Normal. Palpation - Edema - Left - No edema. Right - No edema. Neurologic  Neurologic evaluation reveals  - Cranial nerves grossly intact, no focal neurologic deficits. Motor  Involuntary Movements - Asterixis - not present. Musculoskeletal  Global Assessment  Gait and Station - normal gait and station. Assessment & Plan (Kavin Boyd MD; 12/4/2017 6:11 PM)  Esophageal reflux (530.81  K21.9)  Impression: Continue with esomeprazole daily and use [id number] prn and at bedtime. Dietary and lifestyle recommendations were explained in detail and reinforced. Will proceed with EGD.   Current Plans  Pt Education - GERD  EGD (84391) (Discussed risks and benefits with the patient to include: perforation,or post biopsy bleeding, missed lesions, and sedation reactions.)  Started Dicyclomine HCl 10MG, 1 (one) Capsule as needed, every 8 hours, #60, 30 days starting 12/04/2017, Ref. x3.  Left lower quadrant pain (789.04 R10.32)  Impression: Most likely related to her diverticulosis. Will try dicyclomine and add fiber in the diet. She is due for colonoscopy which will rule out any mucosal lesions. Colon cancer screening (V76.51  Z12.11)  Current Plans  Discussed the risks and benefits of colonoscopy with the patient. COLONOSCOPY, DIAGNOSTIC (13711) (Discussed risks and benefits with the patient to include:; perforation, post polypectomy, or post biopsy bleeding, missed lesions, and sedation reactions.)  Started Suprep Bowel Prep Kit 17.5-3.13-1.6GM/180ML, 180 Milliliter Take as directed before Colonoscopy, 180 Milliliter, 1 day starting 12/04/2017, No Refill. Pt Education - How to access health information online: discussed with patient and provided information. Patient is to call me for any questions or concerns.

## 2018-01-12 NOTE — ANESTHESIA PREPROCEDURE EVALUATION
Anesthetic History   No history of anesthetic complications            Review of Systems / Medical History  Patient summary reviewed and pertinent labs reviewed    Pulmonary  Within defined limits                 Neuro/Psych   Within defined limits           Cardiovascular  Within defined limits                Exercise tolerance: >4 METS  Comments: Low HR per patient   GI/Hepatic/Renal     GERD           Endo/Other        Arthritis and cancer     Other Findings   Comments: Multiple myeloma  S/p stem cell transplant 2010/2016           Physical Exam    Airway  Mallampati: II  TM Distance: 4 - 6 cm  Neck ROM: normal range of motion   Mouth opening: Normal     Cardiovascular  Regular rate and rhythm,  S1 and S2 normal,  no murmur, click, rub, or gallop  Rhythm: regular  Rate: normal         Dental  No notable dental hx       Pulmonary  Breath sounds clear to auscultation               Abdominal  GI exam deferred       Other Findings            Anesthetic Plan    ASA: 3  Anesthesia type: MAC          Induction: Intravenous  Anesthetic plan and risks discussed with: Patient

## 2018-04-20 ENCOUNTER — OFFICE VISIT (OUTPATIENT)
Dept: INTERNAL MEDICINE CLINIC | Age: 73
End: 2018-04-20

## 2018-04-20 VITALS
WEIGHT: 149 LBS | RESPIRATION RATE: 16 BRPM | BODY MASS INDEX: 27.42 KG/M2 | OXYGEN SATURATION: 93 % | DIASTOLIC BLOOD PRESSURE: 57 MMHG | HEIGHT: 62 IN | TEMPERATURE: 97.9 F | HEART RATE: 58 BPM | SYSTOLIC BLOOD PRESSURE: 115 MMHG

## 2018-04-20 DIAGNOSIS — N64.4 PAINFUL BREASTS: ICD-10-CM

## 2018-04-20 DIAGNOSIS — E78.9 LIPID DISORDER: ICD-10-CM

## 2018-04-20 DIAGNOSIS — C90.00 MULTIPLE MYELOMA NOT HAVING ACHIEVED REMISSION (HCC): Primary | ICD-10-CM

## 2018-04-20 DIAGNOSIS — K21.9 GASTROESOPHAGEAL REFLUX DISEASE WITHOUT ESOPHAGITIS: ICD-10-CM

## 2018-04-20 NOTE — PROGRESS NOTES
HISTORY OF PRESENT ILLNESS  Moe Aquino is a 71 y.o. female. HPI   Patient states she had received a booster of most vaccines. She continues to follow up with Dr. Elvira Phoenix and DAYANA. She continues Revlimid. Patient reports she still has the sensitivity of right breast. She states her port was removed in December. GERD: She continues esomeprazole and omeprazole. She continues to monitor diet. Review of Systems   All other systems reviewed and are negative. Physical Exam   Constitutional: She is oriented to person, place, and time. She appears well-developed and well-nourished. HENT:   Head: Normocephalic and atraumatic. Right Ear: External ear normal.   Left Ear: External ear normal.   Nose: Nose normal.   Mouth/Throat: Oropharynx is clear and moist.   Eyes: Conjunctivae and EOM are normal.   Neck: Normal range of motion. Neck supple. Carotid bruit is not present. No thyroid mass and no thyromegaly present. Cardiovascular: Normal rate, regular rhythm, S1 normal, S2 normal, normal heart sounds and intact distal pulses. Pulmonary/Chest: Effort normal and breath sounds normal.   Abdominal: Soft. Normal appearance and bowel sounds are normal. There is no hepatosplenomegaly. There is no tenderness. Musculoskeletal: Normal range of motion. Neurological: She is alert and oriented to person, place, and time. She has normal strength. No cranial nerve deficit or sensory deficit. Coordination normal.   Skin: Skin is warm, dry and intact. No abrasion and no rash noted. Psychiatric: She has a normal mood and affect. Her behavior is normal. Judgment and thought content normal.   Nursing note and vitals reviewed. ASSESSMENT and PLAN  Diagnoses and all orders for this visit:    1. Multiple myeloma not having achieved remission ([facility] [address]  Follow up with Dr. Elvira Phoenix and DAYANA. Continue current meds. 2. Painful breasts  Pain is most likely tissue or nerve related.  Last mammogram was normal, not concerned about cancer, heart or lungs. No SOB, CP, arm pain, or related to the port. 3. Gastroesophageal reflux disease without esophagitis  Stable and well managed. Continue current medications. 4. Lipid disorder  Not on medications at this time, continue to monitor diet. Last LDL was 84 on 5/8/17. Will recheck labs. -     LIPID PANEL  -     METABOLIC PANEL, COMPREHENSIVE    lab results and schedule of future lab studies reviewed with patient  reviewed diet, exercise and weight control    Written by Kelsey Walker, as dictated by Kenya Rosa MD.     Current diagnosis and concerns discussed with pt at length. Understands risks and benefits or current treatment plan and medications and accepts the treatment and medication with any possible risks. Pt asks appropriate questions which were answered. Pt instructed to call with any concerns or problems.

## 2018-04-21 LAB
ALBUMIN SERPL-MCNC: 4.1 G/DL (ref 3.5–4.8)
ALBUMIN/GLOB SERPL: 1.6 {RATIO} (ref 1.2–2.2)
ALP SERPL-CCNC: 42 IU/L (ref 39–117)
ALT SERPL-CCNC: 24 IU/L (ref 0–32)
AST SERPL-CCNC: 24 IU/L (ref 0–40)
BILIRUB SERPL-MCNC: 0.6 MG/DL (ref 0–1.2)
BUN SERPL-MCNC: 16 MG/DL (ref 8–27)
BUN/CREAT SERPL: 20 (ref 12–28)
CALCIUM SERPL-MCNC: 9.4 MG/DL (ref 8.7–10.3)
CHLORIDE SERPL-SCNC: 101 MMOL/L (ref 96–106)
CHOLEST SERPL-MCNC: 192 MG/DL (ref 100–199)
CO2 SERPL-SCNC: 27 MMOL/L (ref 18–29)
CREAT SERPL-MCNC: 0.8 MG/DL (ref 0.57–1)
GFR SERPLBLD CREATININE-BSD FMLA CKD-EPI: 74 ML/MIN/1.73
GFR SERPLBLD CREATININE-BSD FMLA CKD-EPI: 86 ML/MIN/1.73
GLOBULIN SER CALC-MCNC: 2.5 G/DL (ref 1.5–4.5)
GLUCOSE SERPL-MCNC: 90 MG/DL (ref 65–99)
HDLC SERPL-MCNC: 91 MG/DL
INTERPRETATION, 910389: NORMAL
LDLC SERPL CALC-MCNC: 88 MG/DL (ref 0–99)
POTASSIUM SERPL-SCNC: 4.6 MMOL/L (ref 3.5–5.2)
PROT SERPL-MCNC: 6.6 G/DL (ref 6–8.5)
SODIUM SERPL-SCNC: 143 MMOL/L (ref 134–144)
TRIGL SERPL-MCNC: 66 MG/DL (ref 0–149)
VLDLC SERPL CALC-MCNC: 13 MG/DL (ref 5–40)

## 2018-05-18 ENCOUNTER — OFFICE VISIT (OUTPATIENT)
Dept: INTERNAL MEDICINE CLINIC | Age: 73
End: 2018-05-18

## 2018-05-18 VITALS
OXYGEN SATURATION: 99 % | TEMPERATURE: 98.5 F | HEART RATE: 57 BPM | RESPIRATION RATE: 14 BRPM | BODY MASS INDEX: 27.34 KG/M2 | DIASTOLIC BLOOD PRESSURE: 52 MMHG | WEIGHT: 148.6 LBS | SYSTOLIC BLOOD PRESSURE: 122 MMHG | HEIGHT: 62 IN

## 2018-05-18 DIAGNOSIS — C90.00 MULTIPLE MYELOMA NOT HAVING ACHIEVED REMISSION (HCC): ICD-10-CM

## 2018-05-18 DIAGNOSIS — H83.2X9 VESTIBULAR DISEQUILIBRIUM, UNSPECIFIED LATERALITY: ICD-10-CM

## 2018-05-18 DIAGNOSIS — Z00.00 MEDICARE ANNUAL WELLNESS VISIT, SUBSEQUENT: Primary | ICD-10-CM

## 2018-05-18 RX ORDER — MECLIZINE HCL 12.5 MG 12.5 MG/1
12.5 TABLET ORAL
Qty: 30 TAB | Refills: 1 | Status: SHIPPED | OUTPATIENT
Start: 2018-05-18 | End: 2018-05-28

## 2018-05-18 RX ORDER — LENALIDOMIDE 5 MG/1
5 CAPSULE ORAL
COMMUNITY
End: 2019-07-25

## 2018-05-18 NOTE — PROGRESS NOTES
This is the Subsequent Medicare Annual Wellness Exam, performed 12 months or more after the Initial AWV or the last Subsequent AWV    I have reviewed the patient's medical history in detail and updated the computerized patient record. History     Past Medical History:   Diagnosis Date    GERD (gastroesophageal reflux disease) 6/2/2009    H/O stem cell transplant [facility]) 2010 2016    Multiple myeloma     Multiple myeloma in relapse ([facility] [address] 6/2/2009    Multiple myeloma in remission ([facility] [address] 3/17/2011    Multiple myeloma in remission ([facility] [address] 3/17/2011    Osteoarthritis     Tendonitis       Past Surgical History:   Procedure Laterality Date    COLONOSCOPY N/A 1/12/2018    COLONOSCOPY performed by Cari Alegria MD at [address]    hysterectomy    HX OTHER SURGICAL  12/13/2017    cath removed     Current Outpatient Prescriptions   Medication Sig Dispense Refill    lenalidomide (REVLIMID) 5 mg cap Take 5 mg by mouth. Once daily for 14 doses and stop for 1 week and restart cycle again. Total of 5 cycles.  esomeprazole (NEXIUM) 20 mg capsule Take 20 mg by mouth daily. Indications: gastroesophageal reflux disease      aspirin 81 mg chewable tablet Take 81 mg by mouth daily.  pyridoxine, vitamin B6, (VITAMIN B-6) 100 mg tablet Take 100 mg by mouth two (2) times a day.  estradiol (ESTRACE) 0.01 % (0.1 mg/gram) vaginal cream Insert 2 g into vagina daily.  CALCIUM CARBONATE/VITAMIN D3 (CALCIUM + D PO) Take 1,000 mg by mouth two (2) times a day.  omeprazole (PRILOSEC) 20 mg capsule Take 1 Cap by mouth two (2) times a day for 30 days. 60 Cap 3    REVLIMID 25 mg cap 5 mg once over twenty-four (24) hours. Once daily for 14 doses and stop for 1 week and restart cycle again. Total of 5 cycles. Allergies   Allergen Reactions    Ciprofloxacin Other (comments)     Tendonitis.  Keflex [Cephalexin] Rash    Levaquin [Levofloxacin] Other (comments)     Tendinitis.      Family History   Problem Relation Age of Onset   Kashif Llanos Cancer Mother     Hypertension Sister     Thyroid Disease Sister     Hypertension Brother      Social History   Substance Use Topics    Smoking status: Never Smoker    Smokeless tobacco: Never Used    Alcohol use No     Patient Active Problem List   Diagnosis Code    GERD (gastroesophageal reflux disease) K21.9    Advanced care planning/counseling discussion Z71.89    Multiple myeloma not having achieved remission ([facility] [address] C90.00       Depression Risk Factor Screening:     PHQ over the last two weeks 4/20/2018   Little interest or pleasure in doing things Not at all   Feeling down, depressed or hopeless Not at all   Total Score PHQ 2 0     Alcohol Risk Factor Screening: You do not drink alcohol or very rarely. Functional Ability and Level of Safety:   Hearing Loss  Hearing is good. Activities of Daily Living  The home contains: no safety equipment. Patient does total self care    Fall Risk  Fall Risk Assessment, last 12 mths 4/20/2018   Able to walk? Yes   Fall in past 12 months?  No       Abuse Screen  Patient is not abused    Cognitive Screening   Evaluation of Cognitive Function:  Has your family/caregiver stated any concerns about your memory: no  Normal    Patient Care Team   Patient Care Team:  Emerald Quintanilla MD as PCP - Jun Garcia MD (Hematology and Oncology)  Angel Gordillo MD (Ophthalmology)    Assessment/Plan   Education and counseling provided:  Are appropriate based on today's review and evaluation  End-of-Life planning (with patient's consent)        Health Maintenance Due   Topic Date Due    GLAUCOMA SCREENING Q2Y  12/15/2017    MEDICARE YEARLY EXAM  05/09/2018

## 2018-05-18 NOTE — PROGRESS NOTES
HISTORY OF PRESENT ILLNESS  Nadir Pineda is a 71 y.o. female. HPI   Patient reports vertigo has returned. She states she is not affected by the pollen. She feels lightheaded when she bends over. She states the headache is constantly there. She is not treating headache. Patient continues Revlimid 5 mg. She can not tolerate higher doses. She reports fatigue with treatment. She will follow up with Dr. Nakia Iqbal on Wednesday. Last eye exam was with Dr. Rosa Lai in December, [facility]. Review of Systems   All other systems reviewed and are negative. Physical Exam   Constitutional: She is oriented to person, place, and time. She appears well-developed and well-nourished. HENT:   Head: Normocephalic and atraumatic. Right Ear: External ear normal.   Left Ear: External ear normal.   Nose: Nose normal.   Mouth/Throat: Oropharynx is clear and moist.   Eyes: Conjunctivae and EOM are normal.   Neck: Normal range of motion. Neck supple. Carotid bruit is not present. No thyroid mass and no thyromegaly present. Cardiovascular: Normal rate, regular rhythm, S1 normal, S2 normal, normal heart sounds and intact distal pulses. Pulmonary/Chest: Effort normal and breath sounds normal.   Abdominal: Soft. Normal appearance and bowel sounds are normal. There is no hepatosplenomegaly. There is no tenderness. Musculoskeletal: Normal range of motion. Neurological: She is alert and oriented to person, place, and time. She has normal strength. No cranial nerve deficit or sensory deficit. Coordination normal.   Skin: Skin is warm, dry and intact. No abrasion and no rash noted. Psychiatric: She has a normal mood and affect. Her behavior is normal. Judgment and thought content normal.   Nursing note and vitals reviewed. ASSESSMENT and PLAN  Diagnoses and all orders for this visit:    1. Medicare annual wellness visit, subsequent    2.  Multiple myeloma not having achieved remission [facility])  Patient will follow up with  Samson for repeat blood work. Continue current meds. 3. Vestibular disequilibrium, unspecified laterality   She would like to follow up with Dr. Stanley Parham first next week and start medications if it worsens. -     meclizine (ANTIVERT) 12.5 mg tablet; Take 1 Tab by mouth three (3) times daily as needed for up to 10 days. lab results and schedule of future lab studies reviewed with patient  reviewed diet, exercise and weight control    Written by Padilla Mead, as dictated by Zulma Alvarado MD.     Current diagnosis and concerns discussed with pt at length. Understands risks and benefits or current treatment plan and medications and accepts the treatment and medication with any possible risks. Pt asks appropriate questions which were answered. Pt instructed to call with any concerns or problems.

## 2018-05-18 NOTE — PATIENT INSTRUCTIONS
Medicare Wellness Visit, Female    The best way to live healthy is to have a lifestyle where you eat a well-balanced diet, exercise regularly, limit alcohol use, and quit all forms of tobacco/nicotine, if applicable. Regular preventive services are another way to keep healthy. Preventive services (vaccines, screening tests, monitoring & exams) can help personalize your care plan, which helps you manage your own care. Screening tests can find health problems at the earliest stages, when they are easiest to treat. [facility] follows the current, evidence-based guidelines published by the [facility] ([facility]) when recommending preventive services for our patients. Because we follow these guidelines, sometimes recommendations change over time as research supports it. (For example, mammograms used to be recommended annually. Even though Medicare will still pay for an annual mammogram, the newer guidelines recommend a mammogram every two years for women of average risk.)    Of course, you and your provider may decide to screen more often for some diseases, based on your risk and co-morbidities (chronic disease you are already diagnosed with). Preventive services for you include:    - Medicare offers their members a free annual wellness visit, which is time for you and your primary care provider to discuss and plan for your preventive service needs. Take advantage of this benefit every year!    -All people over age 72 should receive the recommended pneumonia vaccines. Current USPSTF guidelines recommend a series of two vaccines for the best pneumonia protection.     -All adults should have a yearly flu vaccine and a tetanus vaccine every 10 years. All adults age 61 years should receive a shingles vaccine once in their lifetime.      -A bone mass density test is recommended when a woman turns 65 to screen for osteoporosis.  This test is only recommended once as a screening. Some providers will use this same test as a disease monitoring tool if you already have osteoporosis. -All adults age 38-68 years who are overweight should have a diabetes screening test once every three years.     -Other screening tests & preventive services for persons with diabetes include: an eye exam to screen for diabetic retinopathy, a kidney function test, a foot exam, and stricter control over your cholesterol.     -Cardiovascular screening for adults with routine risk involves an electrocardiogram (ECG) at intervals determined by the provider.     -Colorectal cancer screenings should be done for adults age 54-65 years with normal risk. There are a number of acceptable methods of screening for this type of cancer. Each test has its own benefits and drawbacks. Discuss with your provider what is most appropriate for you during your annual wellness visit. The different tests include: colonoscopy (considered the best screening method), a fecal occult blood test, a fecal DNA test, and sigmoidoscopy. -Breast cancer screenings are recommended every other year for women of normal risk age 54-69 years.     -Cervical cancer screenings for women over age 72 are only recommended with certain risk factors.     -All adults born between [facility] should be screened once for Hepatitis C.      Here is a list of your current Health Maintenance items (your personalized list of preventive services) with a due date:  Health Maintenance Due   Topic Date Due    Glaucoma Screening   12/15/2017    Annual Well Visit  05/09/2018

## 2018-05-29 ENCOUNTER — OFFICE VISIT (OUTPATIENT)
Dept: INTERNAL MEDICINE CLINIC | Age: 73
End: 2018-05-29

## 2018-05-29 VITALS
TEMPERATURE: 98.9 F | SYSTOLIC BLOOD PRESSURE: 116 MMHG | WEIGHT: 149.2 LBS | DIASTOLIC BLOOD PRESSURE: 54 MMHG | BODY MASS INDEX: 27.46 KG/M2 | RESPIRATION RATE: 14 BRPM | HEART RATE: 59 BPM | OXYGEN SATURATION: 99 % | HEIGHT: 62 IN

## 2018-05-29 DIAGNOSIS — N60.01 BREAST CYST, RIGHT: Primary | ICD-10-CM

## 2018-05-29 DIAGNOSIS — H83.2X9 BALANCE PROBLEM DUE TO VESTIBULAR DYSFUNCTION, UNSPECIFIED LATERALITY: ICD-10-CM

## 2018-05-29 DIAGNOSIS — C90.00 MULTIPLE MYELOMA NOT HAVING ACHIEVED REMISSION (HCC): ICD-10-CM

## 2018-05-29 NOTE — PROGRESS NOTES
HISTORY OF PRESENT ILLNESS  Ja Herron is a 71 y.o. female. HPI  She has a cyst in the right breast which did just come up - she woke up in middle of night and could not sleep and she feels something on outer part of right breast and tender there     MM- she has been taking the revlimid and doing stable for now -she still feels dizzy at times [facility]  and [facility] does [facility]   Review of Systems   Constitutional: Negative. Negative for chills, diaphoresis, fever and malaise/fatigue. HENT: Negative for congestion, ear discharge, ear pain, sore throat and tinnitus. Eyes: Negative for blurred vision, double vision, photophobia and pain. Respiratory: Negative for cough, hemoptysis, sputum production, shortness of breath, wheezing and stridor. Cardiovascular: Negative for chest pain, palpitations, orthopnea, claudication, leg swelling and PND. Gastrointestinal: Negative for abdominal pain, blood in stool, constipation, diarrhea, nausea and vomiting. Musculoskeletal: Negative for back pain, joint pain, myalgias and neck pain. Skin: Negative. Negative for itching and rash. Neurological: Negative for dizziness, tingling, weakness and headaches. Endo/Heme/Allergies: Negative for environmental allergies. Psychiatric/Behavioral: Negative. Physical Exam   Constitutional: She appears well-developed and well-nourished. HENT:   Head: Normocephalic and atraumatic. Right Ear: External ear and ear canal normal.   Left Ear: External ear and ear canal normal.   Nose: No mucosal edema or rhinorrhea. Right sinus exhibits no maxillary sinus tenderness and no frontal sinus tenderness. Left sinus exhibits no maxillary sinus tenderness and no frontal sinus tenderness. Mouth/Throat: Uvula is midline and mucous membranes are normal.   Eyes: Pupils are equal, round, and reactive to light. Neck: Normal range of motion. Neck supple. No thyromegaly present.    Cardiovascular: Normal rate, regular rhythm, normal heart sounds and intact distal pulses. Pulmonary/Chest: Effort normal and breath sounds normal.   Abdominal: Soft. Bowel sounds are normal.   Musculoskeletal: Normal range of motion. Lymphadenopathy:     She has no cervical adenopathy. Skin: Skin is warm and dry. Psychiatric: She has a normal mood and affect. Her behavior is normal. Judgment and thought content normal.   Nursing note and vitals reviewed. ASSESSMENT and PLAN  Diagnoses and all orders for this visit:    1. Breast cyst, right- we will get the ultrasound of breast     2.  Multiple myeloma not having achieved remission (HCC)-cont with revlimid and seeing oncology and had been having a hard time with treatment- she had a hard time when she was outside and pushed herself too much    3. Balance problem due to vestibular dysfunction, unspecified laterality-she has an appt with  next Friday at 6;200- it has not gotten worse and still positional       lab results and schedule of future lab studies reviewed with patient  reviewed diet, exercise and weight control  reviewed medications and side effects in detail

## 2018-06-01 ENCOUNTER — HOSPITAL ENCOUNTER (OUTPATIENT)
Dept: MAMMOGRAPHY | Age: 73
Discharge: HOME | End: 2018-06-01
Attending: INTERNAL MEDICINE
Payer: MEDICARE

## 2018-06-01 DIAGNOSIS — N60.09 CYST OF BREAST, UNSPECIFIED LATERALITY: ICD-10-CM

## 2018-06-01 DIAGNOSIS — N60.09 CYST OF BREAST, UNSPECIFIED LATERALITY: Primary | ICD-10-CM

## 2018-06-01 DIAGNOSIS — N60.01 BREAST CYST, RIGHT: ICD-10-CM

## 2018-06-01 PROCEDURE — 76642 ULTRASOUND BREAST LIMITED: CPT

## 2018-06-01 PROCEDURE — 77065 DX MAMMO INCL CAD UNI: CPT

## 2018-09-06 ENCOUNTER — OFFICE VISIT (OUTPATIENT)
Dept: INTERNAL MEDICINE CLINIC | Age: 73
End: 2018-09-06

## 2018-09-06 VITALS
BODY MASS INDEX: 28.16 KG/M2 | RESPIRATION RATE: 19 BRPM | WEIGHT: 153 LBS | HEART RATE: 63 BPM | OXYGEN SATURATION: 100 % | TEMPERATURE: 97.9 F | HEIGHT: 62 IN | SYSTOLIC BLOOD PRESSURE: 127 MMHG | DIASTOLIC BLOOD PRESSURE: 71 MMHG

## 2018-09-06 DIAGNOSIS — H83.2X9 VESTIBULAR DISEQUILIBRIUM, UNSPECIFIED LATERALITY: ICD-10-CM

## 2018-09-06 DIAGNOSIS — C90.00 MULTIPLE MYELOMA NOT HAVING ACHIEVED REMISSION (HCC): ICD-10-CM

## 2018-09-06 DIAGNOSIS — G44.59 OTHER COMPLICATED HEADACHE SYNDROME: Primary | ICD-10-CM

## 2018-09-06 NOTE — PROGRESS NOTES
Santi Ugarte is a 71 y.o. female Chief Complaint Patient presents with  
 Headache Pt stated that it's on and off. Pt stated that she took Advil for Headache. 1. Have you been to the ER, urgent care clinic since your last visit? Hospitalized since your last visit? No 
 
2. Have you seen or consulted any other health care providers outside of the [address] [place] [address] since your last visit? Include any pap smears or colon screening.  No

## 2018-09-06 NOTE — PROGRESS NOTES
HISTORY OF PRESENT ILLNESS Gina Basurto is a 71 y.o. female. HPI Presents with . HA/Vertigo: Pt has been experiencing continuous HA. She also notes intermittent, sharp pain in left eye and vertigo. She inquires about HA as side effect of Revlimid. Denies SOB, CP, or cough. Myeloma: Stable, and well-managed. Pt continues on Revlimid. She notes considerable fatigue from treatment. She will f/u with oncologist on 10/08/18 to evaluate bone marrow. Review of Systems All other systems reviewed and are negative. Physical Exam  
Constitutional: She is oriented to person, place, and time. She appears well-developed and well-nourished. HENT:  
Head: Normocephalic and atraumatic. Right Ear: External ear normal.  
Left Ear: External ear normal.  
Nose: Nose normal.  
Mouth/Throat: Oropharynx is clear and moist.  
Eyes: Conjunctivae and EOM are normal.  
Neck: Normal range of motion. Neck supple. Carotid bruit is not present. No thyroid mass and no thyromegaly present. Cardiovascular: Normal rate, regular rhythm, S1 normal, S2 normal, normal heart sounds and intact distal pulses. Pulmonary/Chest: Effort normal and breath sounds normal.  
Abdominal: Soft. Normal appearance and bowel sounds are normal. There is no hepatosplenomegaly. There is no tenderness. Musculoskeletal: Normal range of motion. Neurological: She is alert and oriented to person, place, and time. She has normal strength. No cranial nerve deficit or sensory deficit. Coordination normal.  
Skin: Skin is warm, dry and intact. No abrasion and no rash noted. Psychiatric: She has a normal mood and affect. Her behavior is normal. Judgment and thought content normal.  
Nursing note and vitals reviewed. ASSESSMENT and PLAN Diagnoses and all orders for this visit: 
 
1. Other complicated headache syndrome Pt will f/u for brain MRI to rule out serious conditions.  Will monitor for imaging results. Discussed that HA is likely side effect of Revlimid. But given worsening h/a and h/o myeloma and getting treatment we need to rule out other etiologies -     MRI BRAIN W WO CONT; Future 2. Multiple myeloma not having achieved remission ([address] Stable, and well-managed. No change in medications. Pt will f/u with oncologist on 10/08/18 to evaluate bone marrow. -     MRI BRAIN W WO CONT; Future 3. Vestibular disequilibrium, unspecified laterality Pt will f/u for brain MRI to rule out serious conditions. Will monitor for imaging results. -     MRI BRAIN W WO CONT; Future Lab results and schedule of future lab studies reviewed with patient. Reviewed diet, exercise and weight control. Written by George Kelly, as dictated by Bill Moses MD.  
 
Current diagnosis and concerns discussed with pt at length. Understands risks and benefits or current treatment plan and medications and accepts the treatment and medication with any possible risks. Pt asks appropriate questions which were answered. Pt instructed to call with any concerns or problems.

## 2018-09-11 ENCOUNTER — HOSPITAL ENCOUNTER (OUTPATIENT)
Dept: MRI IMAGING | Age: 73
Discharge: HOME | End: 2018-09-11
Attending: INTERNAL MEDICINE
Payer: MEDICARE

## 2018-09-11 DIAGNOSIS — C90.00 MULTIPLE MYELOMA NOT HAVING ACHIEVED REMISSION (HCC): ICD-10-CM

## 2018-09-11 DIAGNOSIS — G44.59 OTHER COMPLICATED HEADACHE SYNDROME: ICD-10-CM

## 2018-09-11 DIAGNOSIS — H83.2X9 VESTIBULAR DISEQUILIBRIUM, UNSPECIFIED LATERALITY: ICD-10-CM

## 2018-09-11 PROCEDURE — A9575 INJ GADOTERATE MEGLUMI 0.1ML: HCPCS | Performed by: INTERNAL MEDICINE

## 2018-09-11 PROCEDURE — 70553 MRI BRAIN STEM W/O & W/DYE: CPT

## 2018-09-11 PROCEDURE — 74011250636 HC RX REV CODE- 250/636: Performed by: INTERNAL MEDICINE

## 2018-09-11 RX ORDER — GADOTERATE MEGLUMINE 376.9 MG/ML
15 INJECTION INTRAVENOUS
Status: COMPLETED | OUTPATIENT
Start: 2018-09-11 | End: 2018-09-11

## 2018-09-11 RX ADMIN — GADOTERATE MEGLUMINE 15 ML: 376.9 INJECTION INTRAVENOUS at 12:22

## 2019-01-10 ENCOUNTER — HOSPITAL ENCOUNTER (OUTPATIENT)
Dept: MAMMOGRAPHY | Age: 74
Discharge: HOME | End: 2019-01-10
Attending: INTERNAL MEDICINE
Payer: MEDICARE

## 2019-01-10 DIAGNOSIS — N63.10 BREAST MASS, RIGHT: ICD-10-CM

## 2019-01-10 DIAGNOSIS — Z12.31 VISIT FOR SCREENING MAMMOGRAM: ICD-10-CM

## 2019-01-10 DIAGNOSIS — N63.10 LUMP OF BREAST, RIGHT: Primary | ICD-10-CM

## 2019-01-10 PROCEDURE — 77065 DX MAMMO INCL CAD UNI: CPT

## 2019-01-10 PROCEDURE — 76642 ULTRASOUND BREAST LIMITED: CPT

## 2019-01-10 PROCEDURE — 77067 SCR MAMMO BI INCL CAD: CPT

## 2019-01-10 PROCEDURE — 77066 DX MAMMO INCL CAD BI: CPT

## 2019-03-26 ENCOUNTER — OFFICE VISIT (OUTPATIENT)
Dept: INTERNAL MEDICINE CLINIC | Age: 74
End: 2019-03-26

## 2019-03-26 VITALS
OXYGEN SATURATION: 100 % | WEIGHT: 152 LBS | SYSTOLIC BLOOD PRESSURE: 127 MMHG | HEART RATE: 62 BPM | RESPIRATION RATE: 16 BRPM | BODY MASS INDEX: 27.97 KG/M2 | DIASTOLIC BLOOD PRESSURE: 79 MMHG | HEIGHT: 62 IN | TEMPERATURE: 97.7 F

## 2019-03-26 DIAGNOSIS — R35.0 FREQUENCY OF URINATION: ICD-10-CM

## 2019-03-26 DIAGNOSIS — C90.00 MULTIPLE MYELOMA NOT HAVING ACHIEVED REMISSION (HCC): Primary | ICD-10-CM

## 2019-03-26 PROBLEM — Z94.84 STEM CELLS TRANSPLANT STATUS (HCC): Status: ACTIVE | Noted: 2019-03-26

## 2019-03-26 LAB
BILIRUB UR QL STRIP: NEGATIVE
GLUCOSE UR-MCNC: NEGATIVE MG/DL
KETONES P FAST UR STRIP-MCNC: NEGATIVE MG/DL
PH UR STRIP: 7.5 [PH] (ref 4.6–8)
PROT UR QL STRIP: NEGATIVE
SP GR UR STRIP: 1.02 (ref 1–1.03)
UA UROBILINOGEN AMB POC: NORMAL (ref 0.2–1)
URINALYSIS CLARITY POC: NORMAL
URINALYSIS COLOR POC: NORMAL
URINE BLOOD POC: NORMAL
URINE LEUKOCYTES POC: NORMAL
URINE NITRITES POC: NEGATIVE

## 2019-03-26 NOTE — PROGRESS NOTES
HISTORY OF PRESENT ILLNESS Vincenzo Garcias is a 68 y.o. female. HPI Presents with . Urinary Frequency: Pt c/o urinary urgency and pelvic pressure. Denies burning with urination. She wipes with small, clean towels and cleans with Lysol. She has been taking cranberry tablets and Azo (2x/day, somewhat effective) regularly. Pt recently followed up with NP at Dr. Layne Hatchet clinic who ordered urine culture (negative findings). She followed up for urinalysis today in clinic which detected trace amounts of blood and WBC. Multiple Myeloma: Stable, and well-managed with Revlimid. Pt continues to f/u with Dr. Antonella Pacheco. Review of Systems Genitourinary: Positive for urgency. All other systems reviewed and are negative. Physical Exam  
Constitutional: She is oriented to person, place, and time. She appears well-developed and well-nourished. HENT:  
Head: Normocephalic and atraumatic. Right Ear: External ear normal.  
Left Ear: External ear normal.  
Nose: Nose normal.  
Mouth/Throat: Oropharynx is clear and moist.  
Eyes: Conjunctivae and EOM are normal.  
Neck: Normal range of motion. Neck supple. Carotid bruit is not present. No thyroid mass and no thyromegaly present. Cardiovascular: Normal rate, regular rhythm, S1 normal, S2 normal, normal heart sounds and intact distal pulses. Pulmonary/Chest: Effort normal and breath sounds normal.  
Abdominal: Soft. Normal appearance and bowel sounds are normal. There is no hepatosplenomegaly. There is no tenderness. Musculoskeletal: Normal range of motion. Neurological: She is alert and oriented to person, place, and time. She has normal strength. No cranial nerve deficit or sensory deficit. Coordination normal.  
Skin: Skin is warm, dry and intact. No abrasion and no rash noted. Psychiatric: She has a normal mood and affect. Her behavior is normal. Judgment and thought content normal.  
Nursing note and vitals reviewed.  
 
 
ASSESSMENT and PLAN 
 Diagnoses and all orders for this visit: 
 
1. Multiple myeloma not having achieved remission ([address] Stable, and well-managed. No change in medications. Pt will continue to f/u with Dr. Xochitl Diaz. 2. Frequency of urination Will monitor for lab results, to r/o UTI. Discussed that symptom could be side effect of Revlimid and that, if urine culture is normal, pt should f/u with Dr. Xochitl Diaz. This is all new since meds and I am concerned this is all due to side effects -     AMB POC URINALYSIS DIP STICK AUTO W/O MICRO 
-     CULTURE, URINE Lab results and schedule of future lab studies reviewed with patient. Reviewed diet, exercise and weight control. Written by Yola Chavez, as dictated by Gerhardt Phenes, MD.  
 
Current diagnosis and concerns discussed with pt at length. Understands risks and benefits or current treatment plan and medications and accepts the treatment and medication with any possible risks. Pt asks appropriate questions which were answered. Pt instructed to call with any concerns or problems. This note will not be viewable in [address] Over 50% of the 25 minutes face to face with Adela Jo consisted of counseling and/or discussing treatment plans in reference to her urinary frequency.

## 2019-03-27 LAB — BACTERIA UR CULT: NORMAL

## 2019-05-15 RX ORDER — HYDROGEN PEROXIDE 3 %
SOLUTION, NON-ORAL MISCELLANEOUS
Qty: 30 CAP | Refills: 5 | Status: SHIPPED | OUTPATIENT
Start: 2019-05-15 | End: 2020-07-24 | Stop reason: SDUPTHER

## 2019-07-25 ENCOUNTER — OFFICE VISIT (OUTPATIENT)
Dept: INTERNAL MEDICINE CLINIC | Age: 74
End: 2019-07-25

## 2019-07-25 VITALS
SYSTOLIC BLOOD PRESSURE: 124 MMHG | BODY MASS INDEX: 29.19 KG/M2 | DIASTOLIC BLOOD PRESSURE: 45 MMHG | RESPIRATION RATE: 16 BRPM | WEIGHT: 158.6 LBS | HEART RATE: 59 BPM | HEIGHT: 62 IN | OXYGEN SATURATION: 100 % | TEMPERATURE: 98.2 F

## 2019-07-25 DIAGNOSIS — Z00.00 MEDICARE ANNUAL WELLNESS VISIT, SUBSEQUENT: Primary | ICD-10-CM

## 2019-07-25 DIAGNOSIS — C90.00 MULTIPLE MYELOMA NOT HAVING ACHIEVED REMISSION (HCC): ICD-10-CM

## 2019-07-25 DIAGNOSIS — H83.2X9 VESTIBULAR DISEQUILIBRIUM, UNSPECIFIED LATERALITY: ICD-10-CM

## 2019-07-25 DIAGNOSIS — K21.9 GASTROESOPHAGEAL REFLUX DISEASE WITHOUT ESOPHAGITIS: ICD-10-CM

## 2019-07-25 NOTE — PROGRESS NOTES
HISTORY OF PRESENT ILLNESS  Rosendo Webster is a 68 y.o. female. HPI   She had episode of vertigo yesterday morning - had dizziness and then threw up and had diarrhea - felt bad but better today    The week before she went outside with a mask and started with cough ; congestion after that and then had this     MM- they changed her pomalyst every day for 21 days and then one week off- much better off revlamid    Quince Devoid- taking the nexium and helping her and taking it in morning     Review of Systems   Constitutional: Negative for chills, diaphoresis, fever, malaise/fatigue and weight loss. HENT: Negative for congestion, ear pain, hearing loss, sore throat and tinnitus. Eyes: Negative for blurred vision and double vision. Respiratory: Negative for cough, hemoptysis, sputum production, shortness of breath and wheezing. Cardiovascular: Negative for chest pain, palpitations, orthopnea, claudication, leg swelling and PND. Gastrointestinal: Negative for abdominal pain, blood in stool, constipation, diarrhea, heartburn, nausea and vomiting. Genitourinary: Negative for dysuria, flank pain, frequency, hematuria and urgency. Musculoskeletal: Negative for back pain, joint pain, myalgias and neck pain. Skin: Negative. Neurological: Negative for dizziness, tingling, sensory change, speech change, focal weakness, seizures, loss of consciousness, weakness and headaches. Endo/Heme/Allergies: Negative for environmental allergies and polydipsia. Does not bruise/bleed easily. Psychiatric/Behavioral: Negative for depression, memory loss, substance abuse and suicidal ideas. The patient is not nervous/anxious and does not have insomnia. Physical Exam   Constitutional: She is oriented to person, place, and time. She appears well-developed and well-nourished. HENT:   Head: Normocephalic and atraumatic.    Right Ear: External ear normal.   Left Ear: External ear normal.   Nose: Nose normal.   Mouth/Throat: Oropharynx is clear and moist.   Eyes: Pupils are equal, round, and reactive to light. Conjunctivae and EOM are normal.   Neck: Normal range of motion. Neck supple. Cardiovascular: Normal rate, regular rhythm, normal heart sounds and intact distal pulses. Pulmonary/Chest: Effort normal and breath sounds normal. Right breast exhibits no inverted nipple, no mass, no nipple discharge, no skin change and no tenderness. Left breast exhibits no inverted nipple, no mass, no nipple discharge, no skin change and no tenderness. No breast swelling, tenderness, discharge or bleeding. Breasts are symmetrical.   Abdominal: Soft. Bowel sounds are normal.   Genitourinary: Rectum normal and vagina normal. Rectal exam shows anal tone normal.   Musculoskeletal: Normal range of motion. Neurological: She is alert and oriented to person, place, and time. Skin: Skin is warm and dry. Psychiatric: She has a normal mood and affect. Her behavior is normal. Judgment and thought content normal.   Nursing note and vitals reviewed. ASSESSMENT and PLAN  Diagnoses and all orders for this visit:    1. Medicare annual wellness visit, subsequent  -     METABOLIC PANEL, COMPREHENSIVE  -     LIPID PANEL    2. Multiple myeloma not having achieved remission (HCC)-stable per oncology     3. Gastroesophageal reflux disease without esophagitis- cont with nexium doing well     4. Vestibular disequilibrium, unspecified laterality    This note will not be viewable in [employer].         lab results and schedule of future lab studies reviewed with patient  reviewed diet, exercise and weight control  cardiovascular risk and specific lipid/LDL goals reviewed  reviewed medications and side effects in detail

## 2019-07-25 NOTE — PATIENT INSTRUCTIONS
Medicare Wellness Visit, Female     The best way to live healthy is to have a lifestyle where you eat a well-balanced diet, exercise regularly, limit alcohol use, and quit all forms of tobacco/nicotine, if applicable. Regular preventive services are another way to keep healthy. Preventive services (vaccines, screening tests, monitoring & exams) can help personalize your care plan, which helps you manage your own care. Screening tests can find health problems at the earliest stages, when they are easiest to treat. [facility] follows the current, evidence-based guidelines published by the [facility] Gerard Eleno ([facility]) when recommending preventive services for our patients. Because we follow these guidelines, sometimes recommendations change over time as research supports it. (For example, mammograms used to be recommended annually. Even though Medicare will still pay for an annual mammogram, the newer guidelines recommend a mammogram every two years for women of average risk.)  Of course, you and your doctor may decide to screen more often for some diseases, based on your risk and your health status. Preventive services for you include:  - Medicare offers their members a free annual wellness visit, which is time for you and your primary care provider to discuss and plan for your preventive service needs. Take advantage of this benefit every year!  -All adults over the age of 72 should receive the recommended pneumonia vaccines. Current USPSTF guidelines recommend a series of two vaccines for the best pneumonia protection.   -All adults should have a flu vaccine yearly and a tetanus vaccine every 10 years. All adults age 61 and older should receive a shingles vaccine once in their lifetime.    -A bone mass density test is recommended when a woman turns 65 to screen for osteoporosis. This test is only recommended one time, as a screening.  Some providers will use this same test as a disease monitoring tool if you already have osteoporosis. -All adults age 38-68 who are overweight should have a diabetes screening test once every three years.   -Other screening tests and preventive services for persons with diabetes include: an eye exam to screen for diabetic retinopathy, a kidney function test, a foot exam, and stricter control over your cholesterol.   -Cardiovascular screening for adults with routine risk involves an electrocardiogram (ECG) at intervals determined by your doctor.   -Colorectal cancer screenings should be done for adults age 54-65 with no increased risk factors for colorectal cancer. There are a number of acceptable methods of screening for this type of cancer. Each test has its own benefits and drawbacks. Discuss with your doctor what is most appropriate for you during your annual wellness visit. The different tests include: colonoscopy (considered the best screening method), a fecal occult blood test, a fecal DNA test, and sigmoidoscopy. -Breast cancer screenings are recommended every other year for women of normal risk, age 54-69.  -Cervical cancer screenings for women over age 72 are only recommended with certain risk factors.   -All adults born between [facility] should be screened once for Hepatitis C.      Here is a list of your current Health Maintenance items (your personalized list of preventive services) with a due date:  Health Maintenance Due   Topic Date Due    Shingles Vaccine (1 of 2) 12/17/1996    Glaucoma Screening   12/15/2017    Annual Well Visit  05/19/2019

## 2019-07-25 NOTE — PROGRESS NOTES
This is the Subsequent Medicare Annual Wellness Exam, performed 12 months or more after the Initial AWV or the last Subsequent AWV    I have reviewed the patient's medical history in detail and updated the computerized patient record. History     Past Medical History:   Diagnosis Date    GERD (gastroesophageal reflux disease) 6/2/2009    H/O stem cell transplant [facility]) 2010 2016    Multiple myeloma     Multiple myeloma in relapse ([facility] [address] 6/2/2009    Multiple myeloma in remission ([facility] [address] 3/17/2011    Multiple myeloma in remission ([facility] [address] 3/17/2011    Osteoarthritis     Tendonitis     Vertigo       Past Surgical History:   Procedure Laterality Date    COLONOSCOPY N/A 1/12/2018    COLONOSCOPY performed by Eugenio John MD at [address]    hysterectomy    HX OTHER SURGICAL  12/13/2017    cath removed     Current Outpatient Medications   Medication Sig Dispense Refill    pomalidomide (POMALYST PO) Take  by mouth.  cranberry conc/C/Bacill coag (AZO CRANBERRY + PROBIOTIC PO) Take  by mouth.  cranberry fruit concentrate (AZO CRANBERRY PO) Take  by mouth.  esomeprazole (NEXIUM) 20 mg capsule TAKE 1 CAPSULE BY MOUTH EVERY DAY 30 Cap 5    peg 400-propylene glycol (SYSTANE, PROPYLENE GLYCOL,) 0.4-0.3 % drop as needed.  aspirin 81 mg chewable tablet Take 81 mg by mouth daily.  pyridoxine, vitamin B6, (VITAMIN B-6) 100 mg tablet Take 100 mg by mouth two (2) times a day.  estradiol (ESTRACE) 0.01 % (0.1 mg/gram) vaginal cream Insert 2 g into vagina daily.  CALCIUM CARBONATE/VITAMIN D3 (CALCIUM + D PO) Take 1,000 mg by mouth two (2) times a day.  omeprazole (PRILOSEC) 20 mg capsule Take 1 Cap by mouth two (2) times a day for 30 days. 60 Cap 3     Allergies   Allergen Reactions    Ciprofloxacin Other (comments)     Tendonitis.  Keflex [Cephalexin] Rash    Levaquin [Levofloxacin] Other (comments)     Tendinitis.      Family History   Problem Relation Age of Onset    Cancer Mother     Hypertension Sister     Thyroid Disease Sister     Hypertension Brother      Social History     Tobacco Use    Smoking status: Never Smoker    Smokeless tobacco: Never Used   Substance Use Topics    Alcohol use: No     Patient Active Problem List   Diagnosis Code    GERD (gastroesophageal reflux disease) K21.9    Advanced care planning/counseling discussion Z71.89    Multiple myeloma not having achieved remission ([facility] [address] C90.00    [facility] Z94.84       Depression Risk Factor Screening:     3 most recent PHQ Screens 7/25/2019   Little interest or pleasure in doing things Not at all   Feeling down, depressed, irritable, or hopeless Not at all   Total Score PHQ 2 0     Alcohol Risk Factor Screening: You do not drink alcohol or very rarely. Functional Ability and Level of Safety:   Hearing Loss  Hearing is good. Activities of Daily Living  The home contains: no safety equipment. Patient does total self care    Fall Risk  Fall Risk Assessment, last 12 mths 7/25/2019   Able to walk? Yes   Fall in past 12 months? No       Abuse Screen  Patient is not abused    Cognitive Screening   Evaluation of Cognitive Function:  Has your family/caregiver stated any concerns about your memory: no  Normal    Patient Care Team   Patient Care Team:  Rashmi Narayan MD as PCP - General  Misa Novak MD (Hematology and Oncology)  Diamond Funk MD (Ophthalmology)    Assessment/Plan   Education and counseling provided:  Are appropriate based on today's review and evaluation  End-of-Life planning (with patient's consent)    Diagnoses and all orders for this visit:    1. Multiple myeloma not having achieved remission ([facility] [facility]    2.  Gastroesophageal reflux disease without esophagitis        Health Maintenance Due   Topic Date Due    Shingrix Vaccine Age 49> (1 of 2) 12/17/1996    GLAUCOMA SCREENING Q2Y  12/15/2017    MEDICARE YEARLY EXAM  05/19/2019     This note will not be viewable in [address]

## 2019-07-26 LAB
ALBUMIN SERPL-MCNC: 4 G/DL (ref 3.5–4.8)
ALBUMIN/GLOB SERPL: 1.5 {RATIO} (ref 1.2–2.2)
ALP SERPL-CCNC: 34 IU/L (ref 39–117)
ALT SERPL-CCNC: 21 IU/L (ref 0–32)
AST SERPL-CCNC: 22 IU/L (ref 0–40)
BILIRUB SERPL-MCNC: 0.5 MG/DL (ref 0–1.2)
BUN SERPL-MCNC: 12 MG/DL (ref 8–27)
BUN/CREAT SERPL: 15 (ref 12–28)
CALCIUM SERPL-MCNC: 9.4 MG/DL (ref 8.7–10.3)
CHLORIDE SERPL-SCNC: 106 MMOL/L (ref 96–106)
CHOLEST SERPL-MCNC: 206 MG/DL (ref 100–199)
CO2 SERPL-SCNC: 24 MMOL/L (ref 20–29)
CREAT SERPL-MCNC: 0.78 MG/DL (ref 0.57–1)
GLOBULIN SER CALC-MCNC: 2.6 G/DL (ref 1.5–4.5)
GLUCOSE SERPL-MCNC: 83 MG/DL (ref 65–99)
HDLC SERPL-MCNC: 91 MG/DL
INTERPRETATION, 910389: NORMAL
LDLC SERPL CALC-MCNC: 99 MG/DL (ref 0–99)
POTASSIUM SERPL-SCNC: 4.4 MMOL/L (ref 3.5–5.2)
PROT SERPL-MCNC: 6.6 G/DL (ref 6–8.5)
SODIUM SERPL-SCNC: 144 MMOL/L (ref 134–144)
TRIGL SERPL-MCNC: 79 MG/DL (ref 0–149)
VLDLC SERPL CALC-MCNC: 16 MG/DL (ref 5–40)

## 2020-01-14 ENCOUNTER — HOSPITAL ENCOUNTER (OUTPATIENT)
Dept: MAMMOGRAPHY | Age: 75
Discharge: HOME | End: 2020-01-14
Attending: INTERNAL MEDICINE
Payer: MEDICARE

## 2020-01-14 DIAGNOSIS — Z12.31 VISIT FOR SCREENING MAMMOGRAM: ICD-10-CM

## 2020-01-14 PROCEDURE — 77067 SCR MAMMO BI INCL CAD: CPT

## 2020-01-29 ENCOUNTER — TELEPHONE (OUTPATIENT)
Dept: INTERNAL MEDICINE CLINIC | Age: 75
End: 2020-01-29

## 2020-01-29 ENCOUNTER — HOSPITAL ENCOUNTER (OUTPATIENT)
Dept: GENERAL RADIOLOGY | Age: 75
Discharge: HOME | End: 2020-01-29
Attending: INTERNAL MEDICINE
Payer: MEDICARE

## 2020-01-29 ENCOUNTER — OFFICE VISIT (OUTPATIENT)
Dept: INTERNAL MEDICINE CLINIC | Age: 75
End: 2020-01-29

## 2020-01-29 VITALS
DIASTOLIC BLOOD PRESSURE: 62 MMHG | BODY MASS INDEX: 29.19 KG/M2 | HEART RATE: 80 BPM | OXYGEN SATURATION: 100 % | RESPIRATION RATE: 18 BRPM | WEIGHT: 158.6 LBS | HEIGHT: 62 IN | TEMPERATURE: 99.6 F | SYSTOLIC BLOOD PRESSURE: 113 MMHG

## 2020-01-29 DIAGNOSIS — J18.9 COMMUNITY ACQUIRED PNEUMONIA, UNSPECIFIED LATERALITY: ICD-10-CM

## 2020-01-29 DIAGNOSIS — R52 BODY ACHES: ICD-10-CM

## 2020-01-29 DIAGNOSIS — R52 BODY ACHES: Primary | ICD-10-CM

## 2020-01-29 DIAGNOSIS — C90.00 MULTIPLE MYELOMA NOT HAVING ACHIEVED REMISSION (HCC): ICD-10-CM

## 2020-01-29 LAB
FLUAV+FLUBV AG NOSE QL IA.RAPID: NEGATIVE POS/NEG
FLUAV+FLUBV AG NOSE QL IA.RAPID: NEGATIVE POS/NEG
VALID INTERNAL CONTROL?: YES

## 2020-01-29 PROCEDURE — 71046 X-RAY EXAM CHEST 2 VIEWS: CPT

## 2020-01-29 RX ORDER — DOXYCYCLINE 100 MG/1
100 TABLET ORAL 2 TIMES DAILY
Qty: 20 TAB | Refills: 0 | Status: SHIPPED | OUTPATIENT
Start: 2020-01-29 | End: 2021-07-26 | Stop reason: ALTCHOICE

## 2020-01-29 NOTE — PROGRESS NOTES
HISTORY OF PRESENT ILLNESS  Tera Knox is a 69 y.o. female. HPI Presents with   Body aches/ cough: Pt states that last Saturday she experienced severe dry coughing, she could feel chest congestion, fatigue, with body aches. Denies fever or chills. She used cough meds with relief. She recently finished up her UTI abx. Multiple Myeloma: Stable and well-managed with chemo. Continues to f/u with her oncologist. She had to cancel the last infusion due to her current illness. Review of Systems   Constitutional: Positive for malaise/fatigue. Negative for chills and fever. Respiratory: Positive for cough (dry). Musculoskeletal: Positive for myalgias (body aches). All other systems reviewed and are negative. Physical Exam  Constitutional:       Appearance: Normal appearance. HENT:      Right Ear: Hearing, tympanic membrane and external ear normal.      Left Ear: Hearing, tympanic membrane and external ear normal.      Mouth/Throat:      Mouth: Mucous membranes are moist.      Pharynx: Oropharynx is clear. Cardiovascular:      Rate and Rhythm: Normal rate and regular rhythm. Pulmonary:      Breath sounds: Normal air entry. Comments: Shallow breathing noted upon exam   Musculoskeletal: Normal range of motion. Skin:     General: Skin is warm and dry. Neurological:      General: No focal deficit present. Mental Status: She is alert and oriented to person, place, and time. Psychiatric:         Mood and Affect: Mood normal.         Behavior: Behavior normal.         ASSESSMENT and PLAN  Diagnoses and all orders for this visit:    1. Body aches  Pt tested negative for influenza today. -     AMB POC SANTHOSH INFLUENZA A/B TEST  -     XR CHEST PA LAT; Future    2. Community acquired pneumonia, unspecified laterality  Ordered CXR to officially r/o pneumonia. Prescribed Doxycycline due to infection and possible pneumonia.  Informed pt that her temperature today was 99.6, and her immunosuppressed due to chemo and she might be starting with pneumonia and not fully presenting. Offered Robitussin with Codeine, pt deferred for now. Informed pt that if her sx are not better- she needs to call our office to get a f/u appt. Will continue to monitor for improvements or changes. -     doxycycline (ADOXA) 100 mg tablet; Take 1 Tab by mouth two (2) times a day. -     XR CHEST PA LAT; Future    3. Multiple myeloma not having achieved remission ([address]  Stable and well-managed per her oncologist. Sandie Anurag with chemo. She had to cancel recently due to her current illness. Will continue to monitor for improvements or changes. Lab results and schedule of future lab studies reviewed with patient. Reviewed diet, exercise and weight control. Written by Alwin Saint, as dictated by Shell Campbell MD.     Current diagnosis and concerns discussed with pt at length. Understands risks and benefits or current treatment plan and medications and accepts the treatment and medication with any possible risks. Pt asks appropriate questions which were answered. Pt instructed to call with any concerns or problems. This note will not be viewable in [address]

## 2020-01-29 NOTE — TELEPHONE ENCOUNTER
----- Message from Jordon Luo MD sent at 1/29/2020  1:36 PM EST -----  Please let patient know xray/ultrasound was normal

## 2020-07-24 RX ORDER — HYDROGEN PEROXIDE 3 %
SOLUTION, NON-ORAL MISCELLANEOUS
Qty: 30 CAP | Refills: 1 | Status: SHIPPED | OUTPATIENT
Start: 2020-07-24 | End: 2020-08-20

## 2020-08-20 RX ORDER — HYDROGEN PEROXIDE 3 %
SOLUTION, NON-ORAL MISCELLANEOUS
Qty: 30 CAP | Refills: 1 | Status: SHIPPED | OUTPATIENT
Start: 2020-08-20 | End: 2020-09-13

## 2020-09-13 RX ORDER — HYDROGEN PEROXIDE 3 %
SOLUTION, NON-ORAL MISCELLANEOUS
Qty: 30 CAP | Refills: 1 | Status: SHIPPED | OUTPATIENT
Start: 2020-09-13 | End: 2020-10-07

## 2020-09-17 ENCOUNTER — OFFICE VISIT (OUTPATIENT)
Dept: INTERNAL MEDICINE CLINIC | Age: 75
End: 2020-09-17
Payer: MEDICARE

## 2020-09-17 VITALS
WEIGHT: 165.8 LBS | OXYGEN SATURATION: 100 % | SYSTOLIC BLOOD PRESSURE: 134 MMHG | RESPIRATION RATE: 16 BRPM | BODY MASS INDEX: 30.33 KG/M2 | DIASTOLIC BLOOD PRESSURE: 84 MMHG | TEMPERATURE: 98.2 F | HEART RATE: 58 BPM

## 2020-09-17 DIAGNOSIS — Z00.00 MEDICARE ANNUAL WELLNESS VISIT, SUBSEQUENT: Primary | ICD-10-CM

## 2020-09-17 DIAGNOSIS — Z20.822 EXPOSURE TO COVID-19 VIRUS: ICD-10-CM

## 2020-09-17 DIAGNOSIS — K21.9 GASTROESOPHAGEAL REFLUX DISEASE WITHOUT ESOPHAGITIS: ICD-10-CM

## 2020-09-17 DIAGNOSIS — C90.00 MULTIPLE MYELOMA NOT HAVING ACHIEVED REMISSION (HCC): ICD-10-CM

## 2020-09-17 DIAGNOSIS — Z23 NEEDS FLU SHOT: ICD-10-CM

## 2020-09-17 LAB
ALBUMIN SERPL-MCNC: 3.6 G/DL (ref 3.5–5)
ALBUMIN/GLOB SERPL: 1.1 {RATIO} (ref 1.1–2.2)
ALP SERPL-CCNC: 35 U/L (ref 45–117)
ALT SERPL-CCNC: 29 U/L (ref 12–78)
ANION GAP SERPL CALC-SCNC: 3 MMOL/L (ref 5–15)
AST SERPL-CCNC: 19 U/L (ref 15–37)
BILIRUB SERPL-MCNC: 0.4 MG/DL (ref 0.2–1)
BUN SERPL-MCNC: 14 MG/DL (ref 6–20)
BUN/CREAT SERPL: 19 (ref 12–20)
CALCIUM SERPL-MCNC: 9.3 MG/DL (ref 8.5–10.1)
CHLORIDE SERPL-SCNC: 106 MMOL/L (ref 97–108)
CHOLEST SERPL-MCNC: 211 MG/DL
CO2 SERPL-SCNC: 30 MMOL/L (ref 21–32)
CREAT SERPL-MCNC: 0.73 MG/DL (ref 0.55–1.02)
ERYTHROCYTE [DISTWIDTH] IN BLOOD BY AUTOMATED COUNT: 15.4 % (ref 11.5–14.5)
GLOBULIN SER CALC-MCNC: 3.4 G/DL (ref 2–4)
GLUCOSE SERPL-MCNC: 86 MG/DL (ref 65–100)
HCT VFR BLD AUTO: 36.7 % (ref 35–47)
HDLC SERPL-MCNC: 92 MG/DL
HDLC SERPL: 2.3 {RATIO} (ref 0–5)
HGB BLD-MCNC: 11.8 G/DL (ref 11.5–16)
LDLC SERPL CALC-MCNC: 99.8 MG/DL (ref 0–100)
LIPID PROFILE,FLP: ABNORMAL
MCH RBC QN AUTO: 28.7 PG (ref 26–34)
MCHC RBC AUTO-ENTMCNC: 32.2 G/DL (ref 30–36.5)
MCV RBC AUTO: 89.3 FL (ref 80–99)
NRBC # BLD: 0 K/UL (ref 0–0.01)
NRBC BLD-RTO: 0 PER 100 WBC
PLATELET # BLD AUTO: 191 K/UL (ref 150–400)
PMV BLD AUTO: 9.7 FL (ref 8.9–12.9)
POTASSIUM SERPL-SCNC: 3.9 MMOL/L (ref 3.5–5.1)
PROT SERPL-MCNC: 7 G/DL (ref 6.4–8.2)
RBC # BLD AUTO: 4.11 M/UL (ref 3.8–5.2)
SARS-COV-2 TOTAL ANTIBODY, CVTOT: NONREACTIVE
SODIUM SERPL-SCNC: 139 MMOL/L (ref 136–145)
TRIGL SERPL-MCNC: 96 MG/DL (ref ?–150)
VLDLC SERPL CALC-MCNC: 19.2 MG/DL
WBC # BLD AUTO: 3.9 K/UL (ref 3.6–11)

## 2020-09-17 PROCEDURE — G8399 PT W/DXA RESULTS DOCUMENT: HCPCS | Performed by: INTERNAL MEDICINE

## 2020-09-17 PROCEDURE — 90694 VACC AIIV4 NO PRSRV 0.5ML IM: CPT

## 2020-09-17 PROCEDURE — 1101F PT FALLS ASSESS-DOCD LE1/YR: CPT | Performed by: INTERNAL MEDICINE

## 2020-09-17 PROCEDURE — G0444 DEPRESSION SCREEN ANNUAL: HCPCS | Performed by: INTERNAL MEDICINE

## 2020-09-17 PROCEDURE — 3017F COLORECTAL CA SCREEN DOC REV: CPT | Performed by: INTERNAL MEDICINE

## 2020-09-17 PROCEDURE — G0008 ADMIN INFLUENZA VIRUS VAC: HCPCS

## 2020-09-17 PROCEDURE — G8510 SCR DEP NEG, NO PLAN REQD: HCPCS | Performed by: INTERNAL MEDICINE

## 2020-09-17 PROCEDURE — 1090F PRES/ABSN URINE INCON ASSESS: CPT | Performed by: INTERNAL MEDICINE

## 2020-09-17 PROCEDURE — G8536 NO DOC ELDER MAL SCRN: HCPCS | Performed by: INTERNAL MEDICINE

## 2020-09-17 PROCEDURE — G8427 DOCREV CUR MEDS BY ELIG CLIN: HCPCS | Performed by: INTERNAL MEDICINE

## 2020-09-17 PROCEDURE — G0439 PPPS, SUBSEQ VISIT: HCPCS | Performed by: INTERNAL MEDICINE

## 2020-09-17 PROCEDURE — G8419 CALC BMI OUT NRM PARAM NOF/U: HCPCS | Performed by: INTERNAL MEDICINE

## 2020-09-17 PROCEDURE — 99213 OFFICE O/P EST LOW 20 MIN: CPT | Performed by: INTERNAL MEDICINE

## 2020-09-17 PROCEDURE — G9899 SCRN MAM PERF RSLTS DOC: HCPCS | Performed by: INTERNAL MEDICINE

## 2020-09-17 NOTE — PATIENT INSTRUCTIONS
Medicare Wellness Visit, Female The best way to live healthy is to have a lifestyle where you eat a well-balanced diet, exercise regularly, limit alcohol use, and quit all forms of tobacco/nicotine, if applicable. Regular preventive services are another way to keep healthy. Preventive services (vaccines, screening tests, monitoring & exams) can help personalize your care plan, which helps you manage your own care. Screening tests can find health problems at the earliest stages, when they are easiest to treat. [facility]zan[facility] follows the current, evidence-based guidelines published by the [facility] Gerard Johansen ([facility]) when recommending preventive services for our patients. Because we follow these guidelines, sometimes recommendations change over time as research supports it. (For example, mammograms used to be recommended annually. Even though Medicare will still pay for an annual mammogram, the newer guidelines recommend a mammogram every two years for women of average risk). Of course, you and your doctor may decide to screen more often for some diseases, based on your risk and your co-morbidities (chronic disease you are already diagnosed with). Preventive services for you include: - Medicare offers their members a free annual wellness visit, which is time for you and your primary care provider to discuss and plan for your preventive service needs. Take advantage of this benefit every year! 
-All adults over the age of 72 should receive the recommended pneumonia vaccines. Current USPSTF guidelines recommend a series of two vaccines for the best pneumonia protection.  
-All adults should have a flu vaccine yearly and a tetanus vaccine every 10 years.  
-All adults age 48 and older should receive the shingles vaccines (series of two vaccines). -All adults age 38-68 who are overweight should have a diabetes screening test once every three years. -All adults born between [birth year] and 1964 should be screened once for Hepatitis C. 
-Other screening tests and preventive services for persons with diabetes include: an eye exam to screen for diabetic retinopathy, a kidney function test, a foot exam, and stricter control over your cholesterol.  
-Cardiovascular screening for adults with routine risk involves an electrocardiogram (ECG) at intervals determined by your doctor.  
-Colorectal cancer screenings should be done for adults age 54-65 with no increased risk factors for colorectal cancer. There are a number of acceptable methods of screening for this type of cancer. Each test has its own benefits and drawbacks. Discuss with your doctor what is most appropriate for you during your annual wellness visit. The different tests include: colonoscopy (considered the best screening method), a fecal occult blood test, a fecal DNA test, and sigmoidoscopy. 
 
-A bone mass density test is recommended when a woman turns 65 to screen for osteoporosis. This test is only recommended one time, as a screening. Some providers will use this same test as a disease monitoring tool if you already have osteoporosis. -Breast cancer screenings are recommended every other year for women of normal risk, age 54-69. 
-Cervical cancer screenings for women over age 72 are only recommended with certain risk factors. Here is a list of your current Health Maintenance items (your personalized list of preventive services) with a due date: 
Health Maintenance Due Topic Date Due  Shingles Vaccine (1 of 2) 12/17/1996  Glaucoma Screening   12/15/2017 [address] [place] [address] Annual Well Visit  07/25/2020  Yearly Flu Vaccine (1) 09/01/2020

## 2020-09-17 NOTE — PROGRESS NOTES
This is the Subsequent Medicare Annual Wellness Exam, performed 12 months or more after the Initial AWV or the last Subsequent AWV    I have reviewed the patient's medical history in detail and updated the computerized patient record. History     Patient Active Problem List   Diagnosis Code    GERD (gastroesophageal reflux disease) K21.9    Advanced care planning/counseling discussion Z71.89    Multiple myeloma not having achieved remission ([address] C90.00    Stem cells transplant status ([facility] [facility] Z94.84     Past Medical History:   Diagnosis Date    GERD (gastroesophageal reflux disease) 6/2/2009    H/O stem cell transplant [facility]) 2010 2016    Multiple myeloma     Multiple myeloma in relapse ([facility] 6/2/2009    Multiple myeloma in remission ([facility] [address] 3/17/2011    Multiple myeloma in remission ([facility] [facility] 3/17/2011    Osteoarthritis     Tendonitis     Vertigo       Past Surgical History:   Procedure Laterality Date    COLONOSCOPY N/A 1/12/2018    COLONOSCOPY performed by Cuong Stokes MD at [address]    hysterectomy    HX OTHER SURGICAL  12/13/2017    cath removed     Current Outpatient Medications   Medication Sig Dispense Refill    esomeprazole (NEXIUM) 20 mg capsule TAKE 1 CAPSULE BY MOUTH EVERY DAY 30 Cap 1    doxycycline (ADOXA) 100 mg tablet Take 1 Tab by mouth two (2) times a day. 20 Tab 0    pomalidomide (POMALYST PO) Take  by mouth.  cranberry conc/C/Bacill coag (AZO CRANBERRY + PROBIOTIC PO) Take  by mouth.  cranberry fruit concentrate (AZO CRANBERRY PO) Take  by mouth.  peg 400-propylene glycol (SYSTANE, PROPYLENE GLYCOL,) 0.4-0.3 % drop as needed.  aspirin 81 mg chewable tablet Take 81 mg by mouth daily.  pyridoxine, vitamin B6, (VITAMIN B-6) 100 mg tablet Take 100 mg by mouth two (2) times a day.  estradiol (ESTRACE) 0.01 % (0.1 mg/gram) vaginal cream Insert 2 g into vagina daily.       CALCIUM CARBONATE/VITAMIN D3 (CALCIUM + D PO) Take 1,000 mg by mouth two (2) times a day.  omeprazole (PRILOSEC) 20 mg capsule Take 1 Cap by mouth two (2) times a day for 30 days. 60 Cap 3     Allergies   Allergen Reactions    Ciprofloxacin Other (comments)     Tendonitis.  Keflex [Cephalexin] Rash    Levaquin [Levofloxacin] Other (comments)     Tendinitis. Family History   Problem Relation Age of Onset   [facility] Cancer Mother     Hypertension Sister     Thyroid Disease Sister     Hypertension Brother      Social History     Tobacco Use    Smoking status: Never Smoker    Smokeless tobacco: Never Used   Substance Use Topics    Alcohol use: No       Depression Risk Factor Screening:     3 most recent PHQ Screens 9/17/2020   Little interest or pleasure in doing things Not at all   Feeling down, depressed, irritable, or hopeless Not at all   Total Score PHQ 2 0       Alcohol Risk Screen   Do you average more than 1 drink per night or more than 7 drinks a week:  No    On any one occasion in the past three months have you have had more than 3 drinks containing alcohol:  No        Functional Ability and Level of Safety:   Hearing: Hearing is good. Activities of Daily Living: The home contains: no safety equipment. Patient does total self care     Ambulation: with no difficulty     Fall Risk:  Fall Risk Assessment, last 12 mths 9/17/2020   Able to walk? Yes   Fall in past 12 months?  No     Abuse Screen:  Patient is not abused       Cognitive Screening   Has your family/caregiver stated any concerns about your memory: no     Cognitive Screening: Normal - MMSE (Mini Mental Status Exam)    Patient Care Team   Patient Care Team:  Emanuel Nj MD as PCP - General  Alicia Munroe MD as PCP - [facility] Empaneled Provider  Alaina Cm MD (Hematology and Oncology)  Marcene Cushing, MD (Ophthalmology)    Assessment/Plan   Education and counseling provided:  Are appropriate based on today's review and evaluation        Health Maintenance Due   Topic Date Due    Shingrix Vaccine Age 50> (1 of 2) 12/17/1996    GLAUCOMA SCREENING Q2Y  12/15/2017    Medicare Yearly Exam  07/25/2020    Flu Vaccine (1) 09/01/2020

## 2020-09-17 NOTE — PROGRESS NOTES
HISTORY OF PRESENT ILLNESS  Kvng Lee is a 69 y.o. female. HPI  MM- she is doing ok but feels tired; pomalyst 1 every day for three weeks and off a week as long as ANC is above 900- the labs have been stable and goes to [facility] in 10/20 -they are going to do a bone marrow biopsy and 24 hour urine and labs and it has been stable      She had to go to dentist  to get the place evaluated in mouth     She is feeling the pain near right breast ; it seems to come and go and sometimes hurts on both sides     Jourdanton Clause- tolerating medicine and feeling well ; she has not been the best in terms of some food choices ; she loves dark chocolate       Review of Systems   All other systems reviewed and are negative. Physical Exam  Vitals signs and nursing note reviewed. Constitutional:       Appearance: Normal appearance. She is well-developed. HENT:      Head: Normocephalic and atraumatic. Right Ear: External ear normal.      Left Ear: External ear normal.      Nose: Nose normal.   Eyes:      Conjunctiva/sclera: Conjunctivae normal.      Pupils: Pupils are equal, round, and reactive to light. Neck:      Musculoskeletal: Normal range of motion and neck supple. Thyroid: No thyromegaly. Vascular: No carotid bruit. Cardiovascular:      Rate and Rhythm: Normal rate and regular rhythm. Heart sounds: Normal heart sounds, S1 normal and S2 normal.   Pulmonary:      Effort: Pulmonary effort is normal.      Breath sounds: Normal breath sounds. Abdominal:      General: Bowel sounds are normal.      Palpations: Abdomen is soft. Tenderness: There is no abdominal tenderness. Musculoskeletal: Normal range of motion. Skin:     General: Skin is warm and dry. Neurological:      Mental Status: She is alert and oriented to person, place, and time. Psychiatric:         Behavior: Behavior normal.         Thought Content:  Thought content normal.         Judgment: Judgment normal.         ASSESSMENT and PLAN  Diagnoses and all orders for this visit:    1. Medicare annual wellness visit, subsequent  -     METABOLIC PANEL, COMPREHENSIVE; Future  -     LIPID PANEL; Future  -     CBC W/O DIFF; Future    2. Needs flu shot  -     FLU (FLUAD QUAD INFLUENZA VACCINE,QUAD,ADJUVANTED)    3. Gastroesophageal reflux disease without esophagitis-cont with nexium and tolerating medicine     4.  Multiple myeloma not having achieved remission (HCC)-being monitored by  and [facility]-has been stable       lab results and schedule of future lab studies reviewed with patient  reviewed diet, exercise and weight control  cardiovascular risk and specific lipid/LDL goals reviewed  reviewed medications and side effects in detail

## 2020-12-10 ENCOUNTER — TRANSCRIBE ORDER (OUTPATIENT)
Dept: INTERNAL MEDICINE CLINIC | Age: 75
End: 2020-12-10

## 2020-12-10 ENCOUNTER — TELEPHONE (OUTPATIENT)
Dept: INTERNAL MEDICINE CLINIC | Age: 75
End: 2020-12-10

## 2020-12-10 RX ORDER — ONDANSETRON 8 MG/1
8 TABLET, ORALLY DISINTEGRATING ORAL
Qty: 20 TAB | Refills: 0 | Status: SHIPPED | OUTPATIENT
Start: 2020-12-10 | End: 2022-09-26 | Stop reason: ALTCHOICE

## 2020-12-10 RX ORDER — MECLIZINE HYDROCHLORIDE 25 MG/1
25 TABLET ORAL
Qty: 30 TAB | Refills: 0 | Status: SHIPPED | OUTPATIENT
Start: 2020-12-10 | End: 2020-12-20

## 2020-12-10 RX ORDER — MECLIZINE HYDROCHLORIDE 25 MG/1
25 TABLET ORAL
Qty: 30 TAB | Refills: 0 | Status: SHIPPED | OUTPATIENT
Start: 2020-12-10 | End: 2020-12-10 | Stop reason: SDUPTHER

## 2020-12-10 NOTE — TELEPHONE ENCOUNTER
----- Message from Clive Oneal sent at 12/10/2020  2:28 PM EST -----  Regarding: Dr Maynor Thomas first and last name: Pt  Reason for call: no VV appts avail for sick visit today  Callback required yes/no and why: Yes, please advise   Best contact number(s): [phone]  Details to clarify the request: Pt states she cannot go anywhere, Hx of vertigo w/ symptoms, w/ nausea and vomiting. Pt would like for you to call in something for the nausea, vomiting and dizziness. Pt is on meds for \"oral chemo\". Pt would like for this to be done today and she has been up all night.      [facility] [address] - [phone]

## 2020-12-10 NOTE — TELEPHONE ENCOUNTER
----- Message from Barron Cano sent at 12/10/2020  1:29 PM EST -----  Regarding: Abril Garcia - MD  Level 1/Escalated Issue      Caller's first and last name and relationship (if not the patient):      Best contact number(s): [phone]      What are the symptoms: Dizziness and vomiting      Transfer successful - yes/no (include outcome): n/A      Transfer declined - yes/no (include reason): N/A      Was caller advised to seek appropriate level of care - yes/no: Yes      Details to clarify the request: Caller advised pt is experiencing dizziness and vomiting-possibly due to vertigo        Haze Bouche

## 2020-12-10 NOTE — TELEPHONE ENCOUNTER
Spoke with patient and advised her that Dr. Andrea Casper has sent in meclizine and zofran but she is to let us know if she is not feeling better. Pt understood and was thankful for the call.

## 2020-12-21 ENCOUNTER — TRANSCRIBE ORDER (OUTPATIENT)
Dept: SCHEDULING | Age: 75
End: 2020-12-21

## 2020-12-21 DIAGNOSIS — Z12.31 VISIT FOR SCREENING MAMMOGRAM: Primary | ICD-10-CM

## 2021-01-29 ENCOUNTER — IMMUNIZATION (OUTPATIENT)
Dept: INTERNAL MEDICINE CLINIC | Age: 76
End: 2021-01-29
Payer: MEDICARE

## 2021-01-29 DIAGNOSIS — Z23 ENCOUNTER FOR IMMUNIZATION: Primary | ICD-10-CM

## 2021-01-29 PROCEDURE — 91301 COVID-19, MRNA, LNP-S, PF, 100MCG/0.5ML DOSE(MODERNA): CPT | Performed by: FAMILY MEDICINE

## 2021-01-29 PROCEDURE — 0011A COVID-19, MRNA, LNP-S, PF, 100MCG/0.5ML DOSE(MODERNA): CPT | Performed by: FAMILY MEDICINE

## 2021-02-16 ENCOUNTER — HOSPITAL ENCOUNTER (OUTPATIENT)
Dept: MAMMOGRAPHY | Age: 76
Discharge: HOME | End: 2021-02-16
Attending: INTERNAL MEDICINE
Payer: MEDICARE

## 2021-02-16 DIAGNOSIS — Z12.31 VISIT FOR SCREENING MAMMOGRAM: ICD-10-CM

## 2021-02-16 PROCEDURE — 77067 SCR MAMMO BI INCL CAD: CPT

## 2021-02-26 ENCOUNTER — IMMUNIZATION (OUTPATIENT)
Dept: INTERNAL MEDICINE CLINIC | Age: 76
End: 2021-02-26
Payer: MEDICARE

## 2021-02-26 DIAGNOSIS — Z23 ENCOUNTER FOR IMMUNIZATION: Primary | ICD-10-CM

## 2021-02-26 PROCEDURE — 91301 COVID-19, MRNA, LNP-S, PF, 100MCG/0.5ML DOSE(MODERNA): CPT | Performed by: FAMILY MEDICINE

## 2021-02-26 PROCEDURE — 0012A COVID-19, MRNA, LNP-S, PF, 100MCG/0.5ML DOSE(MODERNA): CPT | Performed by: FAMILY MEDICINE

## 2021-04-07 RX ORDER — HYDROGEN PEROXIDE 3 %
SOLUTION, NON-ORAL MISCELLANEOUS
Qty: 30 CAP | Refills: 5 | Status: SHIPPED | OUTPATIENT
Start: 2021-04-07 | End: 2021-10-07

## 2021-07-26 ENCOUNTER — OFFICE VISIT (OUTPATIENT)
Dept: INTERNAL MEDICINE CLINIC | Age: 76
End: 2021-07-26
Payer: MEDICARE

## 2021-07-26 VITALS
TEMPERATURE: 98.2 F | RESPIRATION RATE: 16 BRPM | DIASTOLIC BLOOD PRESSURE: 76 MMHG | SYSTOLIC BLOOD PRESSURE: 134 MMHG | BODY MASS INDEX: 30.51 KG/M2 | WEIGHT: 165.8 LBS | OXYGEN SATURATION: 100 % | HEIGHT: 62 IN | HEART RATE: 65 BPM

## 2021-07-26 DIAGNOSIS — C90.00 MULTIPLE MYELOMA NOT HAVING ACHIEVED REMISSION (HCC): ICD-10-CM

## 2021-07-26 DIAGNOSIS — M25.511 ACUTE PAIN OF RIGHT SHOULDER: Primary | ICD-10-CM

## 2021-07-26 PROBLEM — M35.01 KERATOCONJUNCTIVITIS SICCA (HCC): Status: ACTIVE | Noted: 2017-12-27

## 2021-07-26 PROBLEM — R42 VERTIGO: Status: ACTIVE | Noted: 2021-07-26

## 2021-07-26 PROBLEM — Z94.84 STEM CELLS TRANSPLANT STATUS (HCC): Status: RESOLVED | Noted: 2019-03-26 | Resolved: 2021-07-26

## 2021-07-26 PROBLEM — H25.12 AGE-RELATED NUCLEAR CATARACT OF LEFT EYE: Status: ACTIVE | Noted: 2017-12-27

## 2021-07-26 PROBLEM — M81.0 OSTEOPOROSIS: Status: ACTIVE | Noted: 2021-07-26

## 2021-07-26 PROBLEM — M89.8X5 PAIN IN FEMUR: Status: ACTIVE | Noted: 2021-07-26

## 2021-07-26 PROBLEM — M19.90 OSTEOARTHRITIS: Status: ACTIVE | Noted: 2021-07-26

## 2021-07-26 PROCEDURE — 3017F COLORECTAL CA SCREEN DOC REV: CPT | Performed by: INTERNAL MEDICINE

## 2021-07-26 PROCEDURE — 1090F PRES/ABSN URINE INCON ASSESS: CPT | Performed by: INTERNAL MEDICINE

## 2021-07-26 PROCEDURE — G8510 SCR DEP NEG, NO PLAN REQD: HCPCS | Performed by: INTERNAL MEDICINE

## 2021-07-26 PROCEDURE — G9899 SCRN MAM PERF RSLTS DOC: HCPCS | Performed by: INTERNAL MEDICINE

## 2021-07-26 PROCEDURE — 99213 OFFICE O/P EST LOW 20 MIN: CPT | Performed by: INTERNAL MEDICINE

## 2021-07-26 PROCEDURE — 1101F PT FALLS ASSESS-DOCD LE1/YR: CPT | Performed by: INTERNAL MEDICINE

## 2021-07-26 PROCEDURE — G8536 NO DOC ELDER MAL SCRN: HCPCS | Performed by: INTERNAL MEDICINE

## 2021-07-26 PROCEDURE — G8417 CALC BMI ABV UP PARAM F/U: HCPCS | Performed by: INTERNAL MEDICINE

## 2021-07-26 PROCEDURE — G8427 DOCREV CUR MEDS BY ELIG CLIN: HCPCS | Performed by: INTERNAL MEDICINE

## 2021-07-26 NOTE — PROGRESS NOTES
Elisa Gan (: 1945) is a 77 y.o. female, established patient, here for evaluation of the following chief complaint(s):  Shoulder Pain (right shoulder for a month, asper cream with warm compress with some relief)       ASSESSMENT/PLAN:  Below is the assessment and plan developed based on review of pertinent history, physical exam, labs, studies, and medications. 1. Acute pain of right shoulder  I will refer the pt to ortho to explore the possibility of cortisone shots. I encouraged the pt to continue with alleviating remidies and to perform previous PT stretches. Will continue to monitor for improvements or changes. she is going to make an appt with / or  for injection  2. Multiple myeloma not having achieved remission ([address]  Stable and well-managed. Continue with ongoing regimen of Pomalyst.     No follow-ups on file. SUBJECTIVE/OBJECTIVE:  HPI    Shoulder Pain: Pt presents with c/o R shoulder pain that has persisted for a month. She describes the pain as an uncomfortable, aching pain. Pt notes that her pain is alleviated by Asper cream and warm compress. She can achieve normal range of motion, but cannot lay on that side or open things with that side. Pt has injured this area mutliple times in the past and been treated with PT. She is interested in receiving a cortisone shot and resuming previously learned stretches. MM: Pt was informed that she is in remission. Vertigo: Pt reports a recent episode of vertigo. Review of Systems   Musculoskeletal: Positive for myalgias (R shoulder ). All other systems reviewed and are negative. Physical Exam  Constitutional:       Appearance: Normal appearance. HENT:      Right Ear: Tympanic membrane and external ear normal.      Left Ear: Tympanic membrane and external ear normal.      Mouth/Throat:      Mouth: Mucous membranes are moist.      Pharynx: Oropharynx is clear.    Cardiovascular:      Rate and Rhythm: Normal rate and regular rhythm. Pulses: Normal pulses. Heart sounds: Normal heart sounds. Pulmonary:      Effort: Pulmonary effort is normal.      Breath sounds: Normal breath sounds. Musculoskeletal:         General: Normal range of motion. Skin:     General: Skin is warm and dry. Neurological:      General: No focal deficit present. Mental Status: She is alert and oriented to person, place, and time. Psychiatric:         Mood and Affect: Mood normal.         Behavior: Behavior normal.       On this date 07/26/2021 I have spent 20 minutes reviewing previous notes, test results and face to face with the patient discussing the diagnosis and importance of compliance with the treatment plan as well as documenting on the day of the visit. An electronic signature was used to authenticate this note. Written by Felipe Perez as dictated by Dr. Joseph Velazquez.    -- Felipe Perez

## 2021-08-09 ENCOUNTER — TELEPHONE (OUTPATIENT)
Dept: INTERNAL MEDICINE CLINIC | Age: 76
End: 2021-08-09

## 2021-08-09 NOTE — TELEPHONE ENCOUNTER
Called and spoke with patient advised that Dr. Shayla Fleming is out of the office on 9/20/2021 and rescheduled appt for 9/21/2021 at 8:30 AM.

## 2021-09-21 ENCOUNTER — OFFICE VISIT (OUTPATIENT)
Dept: INTERNAL MEDICINE CLINIC | Age: 76
End: 2021-09-21
Payer: MEDICARE

## 2021-09-21 VITALS
HEART RATE: 56 BPM | TEMPERATURE: 98 F | BODY MASS INDEX: 30.62 KG/M2 | RESPIRATION RATE: 16 BRPM | HEIGHT: 62 IN | OXYGEN SATURATION: 100 % | DIASTOLIC BLOOD PRESSURE: 68 MMHG | SYSTOLIC BLOOD PRESSURE: 122 MMHG | WEIGHT: 166.4 LBS

## 2021-09-21 DIAGNOSIS — R53.83 OTHER FATIGUE: ICD-10-CM

## 2021-09-21 DIAGNOSIS — C90.00 MULTIPLE MYELOMA NOT HAVING ACHIEVED REMISSION (HCC): ICD-10-CM

## 2021-09-21 DIAGNOSIS — K21.9 GASTROESOPHAGEAL REFLUX DISEASE WITHOUT ESOPHAGITIS: ICD-10-CM

## 2021-09-21 DIAGNOSIS — Z00.00 MEDICARE ANNUAL WELLNESS VISIT, SUBSEQUENT: Primary | ICD-10-CM

## 2021-09-21 LAB
ALBUMIN SERPL-MCNC: 3.4 G/DL (ref 3.5–5)
ALBUMIN/GLOB SERPL: 0.9 {RATIO} (ref 1.1–2.2)
ALP SERPL-CCNC: 33 U/L (ref 45–117)
ALT SERPL-CCNC: 26 U/L (ref 12–78)
ANION GAP SERPL CALC-SCNC: 5 MMOL/L (ref 5–15)
AST SERPL-CCNC: 16 U/L (ref 15–37)
BASOPHILS # BLD: 0.1 K/UL (ref 0–0.1)
BASOPHILS NFR BLD: 2 % (ref 0–1)
BILIRUB SERPL-MCNC: 0.5 MG/DL (ref 0.2–1)
BUN SERPL-MCNC: 17 MG/DL (ref 6–20)
BUN/CREAT SERPL: 22 (ref 12–20)
CALCIUM SERPL-MCNC: 9.4 MG/DL (ref 8.5–10.1)
CHLORIDE SERPL-SCNC: 107 MMOL/L (ref 97–108)
CHOLEST SERPL-MCNC: 208 MG/DL
CO2 SERPL-SCNC: 28 MMOL/L (ref 21–32)
CREAT SERPL-MCNC: 0.78 MG/DL (ref 0.55–1.02)
DIFFERENTIAL METHOD BLD: ABNORMAL
EOSINOPHIL # BLD: 0 K/UL (ref 0–0.4)
EOSINOPHIL NFR BLD: 1 % (ref 0–7)
ERYTHROCYTE [DISTWIDTH] IN BLOOD BY AUTOMATED COUNT: 15.3 % (ref 11.5–14.5)
GLOBULIN SER CALC-MCNC: 3.6 G/DL (ref 2–4)
GLUCOSE SERPL-MCNC: 88 MG/DL (ref 65–100)
HCT VFR BLD AUTO: 38.9 % (ref 35–47)
HDLC SERPL-MCNC: 90 MG/DL
HDLC SERPL: 2.3 {RATIO} (ref 0–5)
HGB BLD-MCNC: 12.4 G/DL (ref 11.5–16)
IMM GRANULOCYTES # BLD AUTO: 0 K/UL (ref 0–0.04)
IMM GRANULOCYTES NFR BLD AUTO: 0 % (ref 0–0.5)
LDLC SERPL CALC-MCNC: 104 MG/DL (ref 0–100)
LYMPHOCYTES # BLD: 1.9 K/UL (ref 0.8–3.5)
LYMPHOCYTES NFR BLD: 51 % (ref 12–49)
MCH RBC QN AUTO: 28.9 PG (ref 26–34)
MCHC RBC AUTO-ENTMCNC: 31.9 G/DL (ref 30–36.5)
MCV RBC AUTO: 90.7 FL (ref 80–99)
MONOCYTES # BLD: 0.5 K/UL (ref 0–1)
MONOCYTES NFR BLD: 13 % (ref 5–13)
NEUTS SEG # BLD: 1.2 K/UL (ref 1.8–8)
NEUTS SEG NFR BLD: 33 % (ref 32–75)
NRBC # BLD: 0 K/UL (ref 0–0.01)
NRBC BLD-RTO: 0 PER 100 WBC
PLATELET # BLD AUTO: 238 K/UL (ref 150–400)
PMV BLD AUTO: 9.7 FL (ref 8.9–12.9)
POTASSIUM SERPL-SCNC: 4.3 MMOL/L (ref 3.5–5.1)
PROT SERPL-MCNC: 7 G/DL (ref 6.4–8.2)
RBC # BLD AUTO: 4.29 M/UL (ref 3.8–5.2)
SODIUM SERPL-SCNC: 140 MMOL/L (ref 136–145)
TRIGL SERPL-MCNC: 70 MG/DL (ref ?–150)
TSH SERPL DL<=0.05 MIU/L-ACNC: 1.76 UIU/ML (ref 0.36–3.74)
VLDLC SERPL CALC-MCNC: 14 MG/DL
WBC # BLD AUTO: 3.6 K/UL (ref 3.6–11)

## 2021-09-21 PROCEDURE — G8432 DEP SCR NOT DOC, RNG: HCPCS | Performed by: INTERNAL MEDICINE

## 2021-09-21 PROCEDURE — G8427 DOCREV CUR MEDS BY ELIG CLIN: HCPCS | Performed by: INTERNAL MEDICINE

## 2021-09-21 PROCEDURE — 99213 OFFICE O/P EST LOW 20 MIN: CPT | Performed by: INTERNAL MEDICINE

## 2021-09-21 PROCEDURE — G9899 SCRN MAM PERF RSLTS DOC: HCPCS | Performed by: INTERNAL MEDICINE

## 2021-09-21 PROCEDURE — G8536 NO DOC ELDER MAL SCRN: HCPCS | Performed by: INTERNAL MEDICINE

## 2021-09-21 PROCEDURE — G8417 CALC BMI ABV UP PARAM F/U: HCPCS | Performed by: INTERNAL MEDICINE

## 2021-09-21 PROCEDURE — 1101F PT FALLS ASSESS-DOCD LE1/YR: CPT | Performed by: INTERNAL MEDICINE

## 2021-09-21 PROCEDURE — G0439 PPPS, SUBSEQ VISIT: HCPCS | Performed by: INTERNAL MEDICINE

## 2021-09-21 PROCEDURE — 1090F PRES/ABSN URINE INCON ASSESS: CPT | Performed by: INTERNAL MEDICINE

## 2021-09-21 PROCEDURE — 3017F COLORECTAL CA SCREEN DOC REV: CPT | Performed by: INTERNAL MEDICINE

## 2021-09-21 NOTE — PATIENT INSTRUCTIONS
Medicare Wellness Visit, Female     The best way to live healthy is to have a lifestyle where you eat a well-balanced diet, exercise regularly, limit alcohol use, and quit all forms of tobacco/nicotine, if applicable. Regular preventive services are another way to keep healthy. Preventive services (vaccines, screening tests, monitoring & exams) can help personalize your care plan, which helps you manage your own care. Screening tests can find health problems at the earliest stages, when they are easiest to treat. [facility] follows the current, evidence-based guidelines published by the [facility] Johansen ([facility]) when recommending preventive services for our patients. Because we follow these guidelines, sometimes recommendations change over time as research supports it. (For example, mammograms used to be recommended annually. Even though Medicare will still pay for an annual mammogram, the newer guidelines recommend a mammogram every two years for women of average risk). Of course, you and your doctor may decide to screen more often for some diseases, based on your risk and your co-morbidities (chronic disease you are already diagnosed with). Preventive services for you include:  - Medicare offers their members a free annual wellness visit, which is time for you and your primary care provider to discuss and plan for your preventive service needs. Take advantage of this benefit every year!  -All adults over the age of 72 should receive the recommended pneumonia vaccines. Current USPSTF guidelines recommend a series of two vaccines for the best pneumonia protection.   -All adults should have a flu vaccine yearly and a tetanus vaccine every 10 years.   -All adults age 48 and older should receive the shingles vaccines (series of two vaccines).       -All adults age 38-68 who are overweight should have a diabetes screening test once every three years.   -All adults born between 80 and 1965 should be screened once for Hepatitis C.  -Other screening tests and preventive services for persons with diabetes include: an eye exam to screen for diabetic retinopathy, a kidney function test, a foot exam, and stricter control over your cholesterol.   -Cardiovascular screening for adults with routine risk involves an electrocardiogram (ECG) at intervals determined by your doctor.   -Colorectal cancer screenings should be done for adults age 54-65 with no increased risk factors for colorectal cancer. There are a number of acceptable methods of screening for this type of cancer. Each test has its own benefits and drawbacks. Discuss with your doctor what is most appropriate for you during your annual wellness visit. The different tests include: colonoscopy (considered the best screening method), a fecal occult blood test, a fecal DNA test, and sigmoidoscopy.    -A bone mass density test is recommended when a woman turns 65 to screen for osteoporosis. This test is only recommended one time, as a screening. Some providers will use this same test as a disease monitoring tool if you already have osteoporosis. -Breast cancer screenings are recommended every other year for women of normal risk, age 54-69.  -Cervical cancer screenings for women over age 72 are only recommended with certain risk factors.      Here is a list of your current Health Maintenance items (your personalized list of preventive services) with a due date:  Health Maintenance Due   Topic Date Due    Shingles Vaccine (1 of 2) Never done    COVID-19 Vaccine (3 - Moderna risk 3-dose series) 03/26/2021    Yearly Flu Vaccine (1) 09/01/2021    Annual Well Visit  09/18/2021

## 2021-09-21 NOTE — PROGRESS NOTES
Amanda Gonsales (: 1945) is a 77 y.o. female, established patient, here for evaluation of the following chief complaint(s): Annual Wellness Visit       ASSESSMENT/PLAN:  Below is the assessment and plan developed based on review of pertinent history, physical exam, labs, studies, and medications. 1. Medicare annual wellness visit, subsequent  2. Multiple myeloma not having achieved remission ([facility] [address]- seeing Nicki Porter and going to [facility] in [facility] to get evaluated as well; cont with pomalyst- every day for three weeks and off one week   3. Gastroesophageal reflux disease without esophagitis-stable cont current nexium       No follow-ups on file. SUBJECTIVE/OBJECTIVE:  HPI    She became great grandmother and doing well     Patient does not have kerconjunctivitis sicca     Cristhian Farrow- tolerating medicine and taking the nexium which does seem to be helping; she sometimes does eat things that will aggravate her at times but mostly when she lies down and sleeps at night          Review of Systems   All other systems reviewed and are negative. Physical Exam  Constitutional:       Appearance: Normal appearance. HENT:      Right Ear: Tympanic membrane and external ear normal.      Left Ear: Tympanic membrane and external ear normal.      Mouth/Throat:      Mouth: Mucous membranes are moist.      Pharynx: Oropharynx is clear. Cardiovascular:      Rate and Rhythm: Normal rate and regular rhythm. Pulses: Normal pulses. Heart sounds: Normal heart sounds. Pulmonary:      Effort: Pulmonary effort is normal.      Breath sounds: Normal breath sounds. Musculoskeletal:         General: Normal range of motion. Skin:     General: Skin is warm and dry. Neurological:      General: No focal deficit present. Mental Status: She is alert and oriented to person, place, and time.    Psychiatric:         Mood and Affect: Mood normal.         Behavior: Behavior normal.     On this date 2021 I have spent 30 minutes reviewing previous notes, test results and face to face with the patient discussing the diagnosis and importance of compliance with the treatment plan as well as documenting on the day of the visit. An electronic signature was used to authenticate this note. This is the Subsequent Medicare Annual Wellness Exam, performed 12 months or more after the Initial AWV or the last Subsequent AWV    I have reviewed the patient's medical history in detail and updated the computerized patient record. Assessment/Plan   Education and counseling provided:  Are appropriate based on today's review and evaluation    1. Medicare annual wellness visit, subsequent  2. Multiple myeloma not having achieved remission ([facility] [address]  3. Gastroesophageal reflux disease without esophagitis       Depression Risk Factor Screening     3 most recent PHQ Screens 7/26/2021   Little interest or pleasure in doing things Not at all   Feeling down, depressed, irritable, or hopeless Not at all   Total Score PHQ 2 0       Alcohol Risk Screen    Do you average more than 1 drink per night or more than 7 drinks a week:  No    On any one occasion in the past three months have you have had more than 3 drinks containing alcohol:  No        Functional Ability and Level of Safety    Hearing: Hearing is good. Activities of Daily Living: The home contains: no safety equipment. Patient does total self care      Ambulation: with no difficulty     Fall Risk:  Fall Risk Assessment, last 12 mths 7/26/2021   Able to walk? Yes   Fall in past 12 months? 0   Do you feel unsteady?  0   Are you worried about falling 0      Abuse Screen:  Patient is not abused       Cognitive Screening    Has your family/caregiver stated any concerns about your memory: no     Cognitive Screening: Normal - MMSE (Mini Mental Status Exam)    Health Maintenance Due     Health Maintenance Due   Topic Date Due    Shingrix Vaccine Age 50> (1 of 2) Never done    COVID-19 Vaccine (3 - Moderna risk 3-dose series) 03/26/2021    Flu Vaccine (1) 09/01/2021    Medicare Yearly Exam  09/18/2021       Patient Care Team   Patient Care Team:  Gely Sy MD as PCP - General  Gely Sy MD as PCP - [facility]  Marcia Collier MD (Hematology and Oncology)  Katelynn Vazquez MD (Ophthalmology)    History     Patient Active Problem List   Diagnosis Code    GERD (gastroesophageal reflux disease) K21.9    Advanced care planning/counseling discussion Z71.89    Multiple myeloma not having achieved remission ([facility] [address] C90.00    Age-related nuclear cataract of left eye H25.12    Keratoconjunctivitis sicca ([facility] [address] M35.01    Nuclear cataract [id number]    Ocular hypertension H40.059    Osteoarthritis M19.90    Osteoporosis M81.0    Pain in femur M89.8X5    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    Vertigo R42     Past Medical History:   Diagnosis Date    GERD (gastroesophageal reflux disease) 6/2/2009    H/O stem cell transplant ([facility] [address] 2010 2016    Multiple myeloma     Multiple myeloma in relapse ([facility] [address] 6/2/2009    Multiple myeloma in remission ([facility] [address] 3/17/2011    Multiple myeloma in remission ([facility] [address] 3/17/2011    Osteoarthritis     Tendonitis     Vertigo       Past Surgical History:   Procedure Laterality Date    COLONOSCOPY N/A 1/12/2018    COLONOSCOPY performed by Jocelyn Haji MD at [address]    hysterectomy    HX OTHER SURGICAL  12/13/2017    cath removed     Current Outpatient Medications   Medication Sig Dispense Refill    esomeprazole (NEXIUM) 20 mg capsule TAKE 1 CAPSULE BY MOUTH EVERY DAY 30 Cap 5    ondansetron (ZOFRAN ODT) 8 mg disintegrating tablet Take 1 Tab by mouth every eight (8) hours as needed for Nausea or Vomiting. 20 Tab 0    pomalidomide (POMALYST PO) Take  by mouth.  cranberry conc/C/Bacill coag (AZO CRANBERRY + PROBIOTIC PO) Take  by mouth.  cranberry fruit concentrate (AZO CRANBERRY PO) Take  by mouth.       peg 400-propylene glycol (SYSTANE, PROPYLENE GLYCOL,) 0.4-0.3 % drop as needed.  aspirin 81 mg chewable tablet Take 81 mg by mouth daily.  pyridoxine, vitamin B6, (VITAMIN B-6) 100 mg tablet Take 100 mg by mouth two (2) times a day.  estradiol (ESTRACE) 0.01 % (0.1 mg/gram) vaginal cream Insert 2 g into vagina daily.  CALCIUM CARBONATE/VITAMIN D3 (CALCIUM + D PO) Take 1,000 mg by mouth two (2) times a day. Allergies   Allergen Reactions    Ciprofloxacin Other (comments)     Tendonitis.  Keflex [Cephalexin] Rash    Levaquin [Levofloxacin] Other (comments)     Tendinitis.        Family History   Problem Relation Age of Onset    Cancer Mother     Hypertension Sister     Thyroid Disease Sister     Hypertension Brother      Social History     Tobacco Use    Smoking status: Never Smoker    Smokeless tobacco: Never Used   Substance Use Topics    Alcohol use: No         Kingston Ramirez MD

## 2021-10-07 RX ORDER — HYDROGEN PEROXIDE 3 %
SOLUTION, NON-ORAL MISCELLANEOUS
Qty: 30 CAPSULE | Refills: 5 | Status: SHIPPED | OUTPATIENT
Start: 2021-10-07 | End: 2022-03-04

## 2022-01-05 ENCOUNTER — TRANSCRIBE ORDER (OUTPATIENT)
Dept: SCHEDULING | Age: 77
End: 2022-01-05

## 2022-01-05 DIAGNOSIS — Z12.31 SCREENING MAMMOGRAM FOR HIGH-RISK PATIENT: Primary | ICD-10-CM

## 2022-03-04 RX ORDER — HYDROGEN PEROXIDE 3 %
SOLUTION, NON-ORAL MISCELLANEOUS
Qty: 90 CAPSULE | Refills: 1 | Status: SHIPPED | OUTPATIENT
Start: 2022-03-04 | End: 2022-08-26

## 2022-03-16 ENCOUNTER — TELEPHONE (OUTPATIENT)
Dept: INTERNAL MEDICINE CLINIC | Age: 77
End: 2022-03-16

## 2022-03-16 NOTE — TELEPHONE ENCOUNTER
Pt is calling states she has been having headaches for the last past 3 weeks. Pt is not sure if her medications are causing these headaches. Pt is scared she might have a stroke.  Please call back and advise

## 2022-03-17 ENCOUNTER — OFFICE VISIT (OUTPATIENT)
Dept: INTERNAL MEDICINE CLINIC | Age: 77
End: 2022-03-17
Payer: MEDICARE

## 2022-03-17 VITALS
HEIGHT: 62 IN | OXYGEN SATURATION: 100 % | RESPIRATION RATE: 16 BRPM | HEART RATE: 67 BPM | TEMPERATURE: 98 F | BODY MASS INDEX: 30.73 KG/M2 | DIASTOLIC BLOOD PRESSURE: 68 MMHG | WEIGHT: 167 LBS | SYSTOLIC BLOOD PRESSURE: 119 MMHG

## 2022-03-17 DIAGNOSIS — G44.52 NEW DAILY PERSISTENT HEADACHE: Primary | ICD-10-CM

## 2022-03-17 DIAGNOSIS — H83.2X9 VESTIBULAR DISEQUILIBRIUM, UNSPECIFIED LATERALITY: ICD-10-CM

## 2022-03-17 DIAGNOSIS — K21.9 GASTROESOPHAGEAL REFLUX DISEASE WITHOUT ESOPHAGITIS: ICD-10-CM

## 2022-03-17 DIAGNOSIS — C90.00 MULTIPLE MYELOMA NOT HAVING ACHIEVED REMISSION (HCC): ICD-10-CM

## 2022-03-17 DIAGNOSIS — M35.01 KERATOCONJUNCTIVITIS SICCA (HCC): ICD-10-CM

## 2022-03-17 PROCEDURE — G8510 SCR DEP NEG, NO PLAN REQD: HCPCS | Performed by: INTERNAL MEDICINE

## 2022-03-17 PROCEDURE — G8536 NO DOC ELDER MAL SCRN: HCPCS | Performed by: INTERNAL MEDICINE

## 2022-03-17 PROCEDURE — G8427 DOCREV CUR MEDS BY ELIG CLIN: HCPCS | Performed by: INTERNAL MEDICINE

## 2022-03-17 PROCEDURE — 1090F PRES/ABSN URINE INCON ASSESS: CPT | Performed by: INTERNAL MEDICINE

## 2022-03-17 PROCEDURE — 1101F PT FALLS ASSESS-DOCD LE1/YR: CPT | Performed by: INTERNAL MEDICINE

## 2022-03-17 PROCEDURE — 3017F COLORECTAL CA SCREEN DOC REV: CPT | Performed by: INTERNAL MEDICINE

## 2022-03-17 PROCEDURE — G8417 CALC BMI ABV UP PARAM F/U: HCPCS | Performed by: INTERNAL MEDICINE

## 2022-03-17 PROCEDURE — 99214 OFFICE O/P EST MOD 30 MIN: CPT | Performed by: INTERNAL MEDICINE

## 2022-03-17 NOTE — PROGRESS NOTES
Sim Jauregui (: 1945) is a 77 y.o. female, established patient, here for evaluation of the following chief complaint(s):  Headache (has had headaches for the past 3 weeks)       ASSESSMENT/PLAN:  Below is the assessment and plan developed based on review of pertinent history, physical exam, labs, studies, and medications. 1. New daily persistent headache  -     MRI BRAIN W WO CONT; Future  Given her medical history, I will order an MRI of her head to evaluate her sudden onset HA. Treatment dependent on results. 2. Gastroesophageal reflux disease without esophagitis  Stable and well-managed. Continue with ongoing regimen of Nexium. 3. Multiple myeloma not having achieved remission ([facility] [address]  -     MRI BRAIN W WO CONT; Future  Followed by Dr. Uche Polanco, continue with ongoing regimen of Pomalyst.  4. Keratoconjunctivitis sicca ([facility] [address]  Stable and followed by ophthalmology. 5. Vestibular disequilibrium, unspecified laterality  Followed by Dr. Christiano Ortiz, continue with Zofran PRN. No follow-ups on file. SUBJECTIVE/OBJECTIVE:  HPI     Multiple Myeloma: Pt states that her checkups have been stable (Dr. Uche Polanco). Vertigo: Pt reports a spell of vertigo in January and February, for which she consulted Dr. Christiano Ortiz. She denies vomiting during these spells, but associated HA. VANN: Pt presents today with c/o intermittent HA for 3 weeks. She reports pain in her nose, under her eyes, and in her R temple. Pt denies changes in her medications or difficulty sleeping. She notes worsening of her sx when she bends down. Pt denies sore throat or ear pain. Derm: Pt found a spot on the R side of her head, which she plans to have evaluated by Dr. April Otto. Review of Systems   Neurological: Positive for headaches. All other systems reviewed and are negative. Physical Exam  Constitutional:       Appearance: Normal appearance.    HENT:      Right Ear: Tympanic membrane and external ear normal.      Left Ear: Tympanic membrane and external ear normal.      Mouth/Throat:      Mouth: Mucous membranes are moist.      Pharynx: Oropharynx is clear. Cardiovascular:      Rate and Rhythm: Normal rate and regular rhythm. Pulses: Normal pulses. Heart sounds: Normal heart sounds. Pulmonary:      Effort: Pulmonary effort is normal.      Breath sounds: Normal breath sounds. Musculoskeletal:         General: Normal range of motion. Skin:     General: Skin is warm and dry. Neurological:      General: No focal deficit present. Mental Status: She is alert and oriented to person, place, and time. Psychiatric:         Mood and Affect: Mood normal.         Behavior: Behavior normal.       On this date 03/17/2022 I have spent 35 minutes reviewing previous notes, test results and face to face with the patient discussing the diagnosis and importance of compliance with the treatment plan as well as documenting on the day of the visit. An electronic signature was used to authenticate this note. Written by Madalyn Lynhc as dictated by Dr. Noman Pepe.    -- Madalyn Lynch

## 2022-03-17 NOTE — TELEPHONE ENCOUNTER
Spoke with patient and provided in office appointment today at 10:45am.  Pt understood and was thankful for the call.

## 2022-03-18 PROBLEM — C90.00 MULTIPLE MYELOMA NOT HAVING ACHIEVED REMISSION (HCC): Status: ACTIVE | Noted: 2018-04-20

## 2022-03-19 PROBLEM — M89.8X5 PAIN IN FEMUR: Status: ACTIVE | Noted: 2021-07-26

## 2022-03-19 PROBLEM — M35.01 KERATOCONJUNCTIVITIS SICCA (HCC): Status: ACTIVE | Noted: 2017-12-27

## 2022-03-19 PROBLEM — H16.229 KERATOCONJUNCTIVITIS SICCA: Status: ACTIVE | Noted: 2017-12-27

## 2022-03-19 PROBLEM — R42 VERTIGO: Status: ACTIVE | Noted: 2021-07-26

## 2022-03-19 PROBLEM — H25.12 AGE-RELATED NUCLEAR CATARACT OF LEFT EYE: Status: ACTIVE | Noted: 2017-12-27

## 2022-03-19 PROBLEM — M19.90 OSTEOARTHRITIS: Status: ACTIVE | Noted: 2021-07-26

## 2022-03-20 PROBLEM — M81.0 OSTEOPOROSIS: Status: ACTIVE | Noted: 2021-07-26

## 2022-03-23 ENCOUNTER — HOSPITAL ENCOUNTER (OUTPATIENT)
Dept: MRI IMAGING | Age: 77
Discharge: HOME | End: 2022-03-23
Attending: INTERNAL MEDICINE
Payer: MEDICARE

## 2022-03-23 DIAGNOSIS — G44.52 NEW DAILY PERSISTENT HEADACHE: ICD-10-CM

## 2022-03-23 DIAGNOSIS — C90.00 MULTIPLE MYELOMA NOT HAVING ACHIEVED REMISSION (HCC): ICD-10-CM

## 2022-03-23 PROCEDURE — 74011250636 HC RX REV CODE- 250/636: Performed by: INTERNAL MEDICINE

## 2022-03-23 PROCEDURE — 70553 MRI BRAIN STEM W/O & W/DYE: CPT

## 2022-03-23 PROCEDURE — A9576 INJ PROHANCE MULTIPACK: HCPCS | Performed by: INTERNAL MEDICINE

## 2022-03-23 RX ADMIN — GADOTERIDOL 15 ML: 279.3 INJECTION, SOLUTION INTRAVENOUS at 19:40

## 2022-03-24 ENCOUNTER — TELEPHONE (OUTPATIENT)
Dept: INTERNAL MEDICINE CLINIC | Age: 77
End: 2022-03-24

## 2022-03-24 NOTE — TELEPHONE ENCOUNTER
----- Message from Marilee Brown MD sent at 3/24/2022 12:13 PM EDT -----  Please let her know her MRI is normal!

## 2022-05-13 ENCOUNTER — HOSPITAL ENCOUNTER (OUTPATIENT)
Dept: MAMMOGRAPHY | Age: 77
Discharge: HOME | End: 2022-05-13
Attending: INTERNAL MEDICINE
Payer: MEDICARE

## 2022-05-13 DIAGNOSIS — Z12.31 SCREENING MAMMOGRAM FOR HIGH-RISK PATIENT: ICD-10-CM

## 2022-05-13 PROCEDURE — 77067 SCR MAMMO BI INCL CAD: CPT

## 2022-08-26 RX ORDER — HYDROGEN PEROXIDE 3 %
SOLUTION, NON-ORAL MISCELLANEOUS
Qty: 90 CAPSULE | Refills: 1 | Status: SHIPPED | OUTPATIENT
Start: 2022-08-26

## 2022-09-25 NOTE — PATIENT INSTRUCTIONS
Medicare Wellness Visit, Female     The best way to live healthy is to have a lifestyle where you eat a well-balanced diet, exercise regularly, limit alcohol use, and quit all forms of tobacco/nicotine, if applicable. Regular preventive services are another way to keep healthy. Preventive services (vaccines, screening tests, monitoring & exams) can help personalize your care plan, which helps you manage your own care. Screening tests can find health problems at the earliest stages, when they are easiest to treat. [facility] follows the current, evidence-based guidelines published by the [facility] Ger[facility] Johansen ([facility]) when recommending preventive services for our patients. Because we follow these guidelines, sometimes recommendations change over time as research supports it. (For example, mammograms used to be recommended annually. Even though Medicare will still pay for an annual mammogram, the newer guidelines recommend a mammogram every two years for women of average risk). Of course, you and your doctor may decide to screen more often for some diseases, based on your risk and your co-morbidities (chronic disease you are already diagnosed with). Preventive services for you include:  - Medicare offers their members a free annual wellness visit, which is time for you and your primary care provider to discuss and plan for your preventive service needs. Take advantage of this benefit every year!  -All adults over the age of 72 should receive the recommended pneumonia vaccines. Current USPSTF guidelines recommend a series of two vaccines for the best pneumonia protection.   -All adults should have a flu vaccine yearly and a tetanus vaccine every 10 years.   -All adults age 48 and older should receive the shingles vaccines (series of two vaccines).       -All adults age 38-68 who are overweight should have a diabetes screening test once every three years.   -All adults born between [birth year] and 1965 should be screened once for Hepatitis C.  -Other screening tests and preventive services for persons with diabetes include: an eye exam to screen for diabetic retinopathy, a kidney function test, a foot exam, and stricter control over your cholesterol.   -Cardiovascular screening for adults with routine risk involves an electrocardiogram (ECG) at intervals determined by your doctor.   -Colorectal cancer screenings should be done for adults age 54-65 with no increased risk factors for colorectal cancer. There are a number of acceptable methods of screening for this type of cancer. Each test has its own benefits and drawbacks. Discuss with your doctor what is most appropriate for you during your annual wellness visit. The different tests include: colonoscopy (considered the best screening method), a fecal occult blood test, a fecal DNA test, and sigmoidoscopy.    -A bone mass density test is recommended when a woman turns 65 to screen for osteoporosis. This test is only recommended one time, as a screening. Some providers will use this same test as a disease monitoring tool if you already have osteoporosis. -Breast cancer screenings are recommended every other year for women of normal risk, age 54-69.  -Cervical cancer screenings for women over age 72 are only recommended with certain risk factors.      Here is a list of your current Health Maintenance items (your personalized list of preventive services) with a due date:  Health Maintenance Due   Topic Date Due    COVID-19 Vaccine (3 - Moderna risk series) 11/22/2021    Yearly Flu Vaccine (1) 08/01/2022    Annual Well Visit  09/22/2022

## 2022-09-25 NOTE — PROGRESS NOTES
Carolyne Lyon (: 1945) is a 77 y.o. female, established patient, here for evaluation of the following chief complaint(s): Annual Wellness Visit       ASSESSMENT/PLAN:  Below is the assessment and plan developed based on review of pertinent history, physical exam, labs, studies, and medications. 1. Medicare annual wellness visit, subsequent  2. Multiple myeloma not having achieved remission (HCC)-she has been stable pomalyst and has been stable with  and did see Beti Maguire in July and urine has been clear-she has not had labs done for her Pomalyst so I will order her CBC with differential to try to get her [address] today  3. Gastroesophageal reflux disease without esophagitis- saw GI and did EGD and cont with nexium but increased to 40 mg bid due to worsening symptoms and tolerating medicine ; continue prescription as her Nexium has been increased to 40 mg twice a day    No follow-ups on file. SUBJECTIVE/OBJECTIVE:  HEMANT Peraltaing- she is tolerating her nexium and feeling well; had EGD which looked good     Multiple Myeloma- she has been feeling well- she has been on pomalyst since 2019 as maintenance therapy and this has been complicated by frequent URI and recurrent UTI    She did have EGD done recently and looked normal and waiting on biopsy result    She had vertigo for which I saw her last time and she was suppose to follow up with ENT; we did a MRI brain which was normal      Review of Systems   All other systems reviewed and are negative. Physical Exam  Vitals and nursing note reviewed. Constitutional:       Appearance: Normal appearance. She is well-developed. HENT:      Head: Normocephalic and atraumatic. Right Ear: External ear normal.      Left Ear: External ear normal.      Nose: Nose normal.   Eyes:      Conjunctiva/sclera: Conjunctivae normal.      Pupils: Pupils are equal, round, and reactive to light. Neck:      Thyroid: No thyromegaly.       Vascular: No carotid bruit. Cardiovascular:      Rate and Rhythm: Normal rate and regular rhythm. Heart sounds: Normal heart sounds, S1 normal and S2 normal.   Pulmonary:      Effort: Pulmonary effort is normal.      Breath sounds: Normal breath sounds. Abdominal:      General: Bowel sounds are normal.      Palpations: Abdomen is soft. Tenderness: There is no abdominal tenderness. Musculoskeletal:         General: Normal range of motion. Cervical back: Normal range of motion and neck supple. Skin:     General: Skin is warm and dry. Neurological:      Mental Status: She is alert and oriented to person, place, and time. Psychiatric:         Behavior: Behavior normal.         Thought Content: Thought content normal.         Judgment: Judgment normal.       On this date 09/26/2022 I have spent 30 minutes reviewing previous notes, test results and face to face with the patient discussing the diagnosis and importance of compliance with the treatment plan as well as documenting on the day of the visit. An electronic signature was used to authenticate this note. -- Gala Clayton MD   This is the Subsequent Medicare Annual Wellness Exam, performed 12 months or more after the Initial AWV or the last Subsequent AWV    I have reviewed the patient's medical history in detail and updated the computerized patient record. Assessment/Plan   Education and counseling provided:  Are appropriate based on today's review and evaluation    1. Medicare annual wellness visit, subsequent  2. Multiple myeloma not having achieved remission ([facility] [address]  3.  Gastroesophageal reflux disease without esophagitis       Depression Risk Factor Screening     3 most recent PHQ Screens 9/26/2022   Little interest or pleasure in doing things Not at all   Feeling down, depressed, irritable, or hopeless Not at all   Total Score PHQ 2 0       Alcohol & Drug Abuse Risk Screen    Do you average more than 1 drink per night or more than 7 drinks a week:  No    On any one occasion in the past three months have you have had more than 3 drinks containing alcohol:  No          Functional Ability and Level of Safety    Hearing: Hearing is good. Activities of Daily Living: The home contains: no safety equipment. Patient does total self care      Ambulation: with no difficulty     Fall Risk:  Fall Risk Assessment, last 12 mths 9/26/2022   Able to walk? Yes   Fall in past 12 months? 0   Do you feel unsteady?  0   Are you worried about falling 0      Abuse Screen:  Patient is not abused       Cognitive Screening    Has your family/caregiver stated any concerns about your memory: no     Cognitive Screening: Normal - MMSE (Mini Mental Status Exam)    Health Maintenance Due     Health Maintenance Due   Topic Date Due    COVID-19 Vaccine (3 - Moderna risk series) 11/22/2021    Medicare Yearly Exam  09/22/2022       Patient Care Team   Patient Care Team:  Denise Rogers MD as PCP - [facility], Fredirick Severin, MD as PCP - [facility]  Sergei Kwok MD (Hematology and Oncology)  Carrie Reilly MD (Ophthalmology)    History     Patient Active Problem List   Diagnosis Code    GERD (gastroesophageal reflux disease) K21.9    Advanced care planning/counseling discussion Z71.89    Multiple myeloma not having achieved remission ([address] C90.00    Age-related nuclear cataract of left eye H25.12    Keratoconjunctivitis sicca ([address] M35.01    Nuclear cataract [id number]    Ocular hypertension H40.059    Osteoarthritis M19.90    Osteoporosis M81.0    Pain in femur M89.8X5    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    Vertigo R42     Past Medical History:   Diagnosis Date    GERD (gastroesophageal reflux disease) 6/2/2009    H/O stem cell transplant [facility]) 2010 2016    Multiple myeloma     Multiple myeloma in relapse ([address] 6/2/2009    Multiple myeloma in remission ([address] 3/17/2011    Multiple myeloma in remission ([address] 3/17/2011    Osteoarthritis     Tendonitis     Vertigo Past Surgical History:   Procedure Laterality Date    COLONOSCOPY N/A 1/12/2018    COLONOSCOPY performed by Claire Huerta MD at [address] [facility]    hysterectomy    HX OTHER SURGICAL  12/13/2017    cath removed     Current Outpatient Medications   Medication Sig Dispense Refill    esomeprazole (NEXIUM) 20 mg capsule TAKE 1 CAPSULE BY MOUTH EVERY DAY (Patient taking differently: 40 mg. TAKE 1 CAPSULE BY MOUTH EVERY DAY) 90 Capsule 1    pomalidomide (POMALYST PO) Take  by mouth.      cranberry conc/C/Bacill coag (AZO CRANBERRY + PROBIOTIC PO) Take  by mouth.      peg 400-propylene glycol (SYSTANE) 0.4-0.3 % drop as needed. aspirin 81 mg chewable tablet Take 81 mg by mouth daily. pyridoxine, vitamin B6, (VITAMIN B-6) 100 mg tablet Take 100 mg by mouth two (2) times a day. estradiol (ESTRACE) 0.01 % (0.1 mg/gram) vaginal cream Insert 2 g into vagina daily. CALCIUM CARBONATE/VITAMIN D3 (CALCIUM + D PO) Take 1,000 mg by mouth two (2) times a day. Allergies   Allergen Reactions    Ciprofloxacin Other (comments)     Tendonitis. Keflex [Cephalexin] Rash    Levaquin [Levofloxacin] Other (comments)     Tendinitis.        Family History   Problem Relation Age of Onset    Cancer Mother     Hypertension Sister     Thyroid Disease Sister     Hypertension Brother      Social History     Tobacco Use    Smoking status: Never    Smokeless tobacco: Never   Substance Use Topics    Alcohol use: No         Shamika Alvarez MD

## 2022-09-26 ENCOUNTER — OFFICE VISIT (OUTPATIENT)
Dept: INTERNAL MEDICINE CLINIC | Age: 77
End: 2022-09-26
Payer: MEDICARE

## 2022-09-26 VITALS
DIASTOLIC BLOOD PRESSURE: 70 MMHG | TEMPERATURE: 98.1 F | RESPIRATION RATE: 14 BRPM | HEART RATE: 54 BPM | SYSTOLIC BLOOD PRESSURE: 129 MMHG | OXYGEN SATURATION: 97 % | BODY MASS INDEX: 29.81 KG/M2 | HEIGHT: 62 IN | WEIGHT: 162 LBS

## 2022-09-26 DIAGNOSIS — Z00.00 MEDICARE ANNUAL WELLNESS VISIT, SUBSEQUENT: Primary | ICD-10-CM

## 2022-09-26 DIAGNOSIS — C90.00 MULTIPLE MYELOMA NOT HAVING ACHIEVED REMISSION (HCC): ICD-10-CM

## 2022-09-26 DIAGNOSIS — K21.9 GASTROESOPHAGEAL REFLUX DISEASE WITHOUT ESOPHAGITIS: ICD-10-CM

## 2022-09-26 PROCEDURE — G8510 SCR DEP NEG, NO PLAN REQD: HCPCS | Performed by: INTERNAL MEDICINE

## 2022-09-26 PROCEDURE — 99213 OFFICE O/P EST LOW 20 MIN: CPT | Performed by: INTERNAL MEDICINE

## 2022-09-26 PROCEDURE — G8427 DOCREV CUR MEDS BY ELIG CLIN: HCPCS | Performed by: INTERNAL MEDICINE

## 2022-09-26 PROCEDURE — G0439 PPPS, SUBSEQ VISIT: HCPCS | Performed by: INTERNAL MEDICINE

## 2022-09-26 PROCEDURE — 1101F PT FALLS ASSESS-DOCD LE1/YR: CPT | Performed by: INTERNAL MEDICINE

## 2022-09-26 PROCEDURE — 3017F COLORECTAL CA SCREEN DOC REV: CPT | Performed by: INTERNAL MEDICINE

## 2022-09-26 PROCEDURE — G8417 CALC BMI ABV UP PARAM F/U: HCPCS | Performed by: INTERNAL MEDICINE

## 2022-09-26 PROCEDURE — 1090F PRES/ABSN URINE INCON ASSESS: CPT | Performed by: INTERNAL MEDICINE

## 2022-09-26 PROCEDURE — G8536 NO DOC ELDER MAL SCRN: HCPCS | Performed by: INTERNAL MEDICINE

## 2022-09-27 LAB
ALBUMIN SERPL-MCNC: 3.6 G/DL (ref 3.5–5)
ALBUMIN/GLOB SERPL: 1.1 {RATIO} (ref 1.1–2.2)
ALP SERPL-CCNC: 34 U/L (ref 45–117)
ALT SERPL-CCNC: 29 U/L (ref 12–78)
ANION GAP SERPL CALC-SCNC: 4 MMOL/L (ref 5–15)
AST SERPL-CCNC: 22 U/L (ref 15–37)
BASOPHILS # BLD: 0 K/UL (ref 0–0.1)
BASOPHILS NFR BLD: 1 % (ref 0–1)
BILIRUB SERPL-MCNC: 0.5 MG/DL (ref 0.2–1)
BUN SERPL-MCNC: 13 MG/DL (ref 6–20)
BUN/CREAT SERPL: 15 (ref 12–20)
CALCIUM SERPL-MCNC: 9.5 MG/DL (ref 8.5–10.1)
CHLORIDE SERPL-SCNC: 108 MMOL/L (ref 97–108)
CHOLEST SERPL-MCNC: 189 MG/DL
CO2 SERPL-SCNC: 30 MMOL/L (ref 21–32)
CREAT SERPL-MCNC: 0.86 MG/DL (ref 0.55–1.02)
DIFFERENTIAL METHOD BLD: ABNORMAL
EOSINOPHIL # BLD: 0 K/UL (ref 0–0.4)
EOSINOPHIL NFR BLD: 1 % (ref 0–7)
ERYTHROCYTE [DISTWIDTH] IN BLOOD BY AUTOMATED COUNT: 15.1 % (ref 11.5–14.5)
GLOBULIN SER CALC-MCNC: 3.4 G/DL (ref 2–4)
GLUCOSE SERPL-MCNC: 82 MG/DL (ref 65–100)
HCT VFR BLD AUTO: 37.2 % (ref 35–47)
HDLC SERPL-MCNC: 80 MG/DL
HDLC SERPL: 2.4 {RATIO} (ref 0–5)
HGB BLD-MCNC: 11.9 G/DL (ref 11.5–16)
IMM GRANULOCYTES # BLD AUTO: 0 K/UL (ref 0–0.04)
IMM GRANULOCYTES NFR BLD AUTO: 0 % (ref 0–0.5)
LDLC SERPL CALC-MCNC: 93.4 MG/DL (ref 0–100)
LYMPHOCYTES # BLD: 1.9 K/UL (ref 0.8–3.5)
LYMPHOCYTES NFR BLD: 62 % (ref 12–49)
MCH RBC QN AUTO: 28.8 PG (ref 26–34)
MCHC RBC AUTO-ENTMCNC: 32 G/DL (ref 30–36.5)
MCV RBC AUTO: 90.1 FL (ref 80–99)
MONOCYTES # BLD: 0.4 K/UL (ref 0–1)
MONOCYTES NFR BLD: 12 % (ref 5–13)
NEUTS SEG # BLD: 0.7 K/UL (ref 1.8–8)
NEUTS SEG NFR BLD: 24 % (ref 32–75)
NRBC # BLD: 0 K/UL (ref 0–0.01)
NRBC BLD-RTO: 0 PER 100 WBC
PLATELET # BLD AUTO: 174 K/UL (ref 150–400)
PMV BLD AUTO: 10.2 FL (ref 8.9–12.9)
POTASSIUM SERPL-SCNC: 4 MMOL/L (ref 3.5–5.1)
PROT SERPL-MCNC: 7 G/DL (ref 6.4–8.2)
RBC # BLD AUTO: 4.13 M/UL (ref 3.8–5.2)
RBC MORPH BLD: ABNORMAL
SODIUM SERPL-SCNC: 142 MMOL/L (ref 136–145)
TRIGL SERPL-MCNC: 78 MG/DL (ref ?–150)
VLDLC SERPL CALC-MCNC: 15.6 MG/DL
WBC # BLD AUTO: 3 K/UL (ref 3.6–11)

## 2023-01-23 ENCOUNTER — TRANSCRIBE ORDER (OUTPATIENT)
Dept: SCHEDULING | Age: 78
End: 2023-01-23

## 2023-01-23 DIAGNOSIS — Z12.31 ENCOUNTER FOR SCREENING MAMMOGRAM FOR MALIGNANT NEOPLASM OF BREAST: Primary | ICD-10-CM

## 2023-04-22 DIAGNOSIS — Z12.31 ENCOUNTER FOR SCREENING MAMMOGRAM FOR MALIGNANT NEOPLASM OF BREAST: Primary | ICD-10-CM

## 2023-05-15 ENCOUNTER — HOSPITAL ENCOUNTER (OUTPATIENT)
Facility: HOSPITAL | Age: 78
Discharge: HOME | End: 2023-05-18
Payer: MEDICARE

## 2023-05-15 DIAGNOSIS — Z12.31 VISIT FOR SCREENING MAMMOGRAM: ICD-10-CM

## 2023-05-15 PROCEDURE — 77067 SCR MAMMO BI INCL CAD: CPT

## 2023-05-25 ENCOUNTER — TELEPHONE (OUTPATIENT)
Age: 78
End: 2023-05-25

## 2023-05-25 NOTE — TELEPHONE ENCOUNTER
Spoke with patient and she states that she has a hard \"growth\" on one of her toes that she feels may be a callus but she would like a podiatrist to look at it. Provided contact info for [facility] and [facility]. Pt understood and was thankful for the call.

## 2023-09-27 ENCOUNTER — OFFICE VISIT (OUTPATIENT)
Age: 78
End: 2023-09-27
Payer: MEDICARE

## 2023-09-27 VITALS
BODY MASS INDEX: 28.34 KG/M2 | RESPIRATION RATE: 16 BRPM | WEIGHT: 154 LBS | OXYGEN SATURATION: 97 % | DIASTOLIC BLOOD PRESSURE: 76 MMHG | HEART RATE: 53 BPM | SYSTOLIC BLOOD PRESSURE: 137 MMHG | HEIGHT: 62 IN | TEMPERATURE: 98.2 F

## 2023-09-27 DIAGNOSIS — C90.00 MULTIPLE MYELOMA NOT HAVING ACHIEVED REMISSION (HCC): ICD-10-CM

## 2023-09-27 DIAGNOSIS — K21.9 GASTRO-ESOPHAGEAL REFLUX DISEASE WITHOUT ESOPHAGITIS: ICD-10-CM

## 2023-09-27 DIAGNOSIS — Z00.00 MEDICARE ANNUAL WELLNESS VISIT, SUBSEQUENT: Primary | ICD-10-CM

## 2023-09-27 LAB
CHOLEST SERPL-MCNC: 209 MG/DL
HDLC SERPL-MCNC: 95 MG/DL
HDLC SERPL: 2.2 (ref 0–5)
LDLC SERPL CALC-MCNC: 99.8 MG/DL (ref 0–100)
TRIGL SERPL-MCNC: 71 MG/DL
VLDLC SERPL CALC-MCNC: 14.2 MG/DL

## 2023-09-27 PROCEDURE — G8427 DOCREV CUR MEDS BY ELIG CLIN: HCPCS | Performed by: INTERNAL MEDICINE

## 2023-09-27 PROCEDURE — G8399 PT W/DXA RESULTS DOCUMENT: HCPCS | Performed by: INTERNAL MEDICINE

## 2023-09-27 PROCEDURE — 1036F TOBACCO NON-USER: CPT | Performed by: INTERNAL MEDICINE

## 2023-09-27 PROCEDURE — 99213 OFFICE O/P EST LOW 20 MIN: CPT | Performed by: INTERNAL MEDICINE

## 2023-09-27 PROCEDURE — 1123F ACP DISCUSS/DSCN MKR DOCD: CPT | Performed by: INTERNAL MEDICINE

## 2023-09-27 PROCEDURE — G8419 CALC BMI OUT NRM PARAM NOF/U: HCPCS | Performed by: INTERNAL MEDICINE

## 2023-09-27 PROCEDURE — G0439 PPPS, SUBSEQ VISIT: HCPCS | Performed by: INTERNAL MEDICINE

## 2023-09-27 PROCEDURE — 1090F PRES/ABSN URINE INCON ASSESS: CPT | Performed by: INTERNAL MEDICINE

## 2023-09-27 SDOH — ECONOMIC STABILITY: FOOD INSECURITY: WITHIN THE PAST 12 MONTHS, THE FOOD YOU BOUGHT JUST DIDN'T LAST AND YOU DIDN'T HAVE MONEY TO GET MORE.: NEVER TRUE

## 2023-09-27 SDOH — ECONOMIC STABILITY: HOUSING INSECURITY
IN THE LAST 12 MONTHS, WAS THERE A TIME WHEN YOU DID NOT HAVE A STEADY PLACE TO SLEEP OR SLEPT IN A SHELTER (INCLUDING NOW)?: NO

## 2023-09-27 SDOH — ECONOMIC STABILITY: INCOME INSECURITY: HOW HARD IS IT FOR YOU TO PAY FOR THE VERY BASICS LIKE FOOD, HOUSING, MEDICAL CARE, AND HEATING?: NOT HARD AT ALL

## 2023-09-27 SDOH — ECONOMIC STABILITY: FOOD INSECURITY: WITHIN THE PAST 12 MONTHS, YOU WORRIED THAT YOUR FOOD WOULD RUN OUT BEFORE YOU GOT MONEY TO BUY MORE.: NEVER TRUE

## 2023-09-27 ASSESSMENT — PATIENT HEALTH QUESTIONNAIRE - PHQ9
SUM OF ALL RESPONSES TO PHQ QUESTIONS 1-9: 0
SUM OF ALL RESPONSES TO PHQ QUESTIONS 1-9: 0
1. LITTLE INTEREST OR PLEASURE IN DOING THINGS: 0
SUM OF ALL RESPONSES TO PHQ QUESTIONS 1-9: 0
SUM OF ALL RESPONSES TO PHQ9 QUESTIONS 1 & 2: 0
SUM OF ALL RESPONSES TO PHQ QUESTIONS 1-9: 0
2. FEELING DOWN, DEPRESSED OR HOPELESS: 0

## 2023-09-27 ASSESSMENT — LIFESTYLE VARIABLES
HOW OFTEN DO YOU HAVE A DRINK CONTAINING ALCOHOL: NEVER
HOW MANY STANDARD DRINKS CONTAINING ALCOHOL DO YOU HAVE ON A TYPICAL DAY: PATIENT DOES NOT DRINK

## 2023-09-27 NOTE — PATIENT INSTRUCTIONS
[place] on Aging online. You need 6834-2742 mg of calcium and 2831-0964 IU of vitamin D per day. It is possible to meet your calcium requirement with diet alone, but a vitamin D supplement is usually necessary to meet this goal.  When exposed to the sun, use a sunscreen that protects against both UVA and UVB radiation with an SPF of 30 or greater. Reapply every 2 to 3 hours or after sweating, drying off with a towel, or swimming. Always wear a seat belt when traveling in a car. Always wear a helmet when riding a bicycle or motorcycle.

## 2024-02-09 ENCOUNTER — TRANSCRIBE ORDERS (OUTPATIENT)
Facility: HOSPITAL | Age: 79
End: 2024-02-09

## 2024-02-09 DIAGNOSIS — Z12.31 VISIT FOR SCREENING MAMMOGRAM: Primary | ICD-10-CM

## 2024-05-21 ENCOUNTER — HOSPITAL ENCOUNTER (OUTPATIENT)
Facility: HOSPITAL | Age: 79
Discharge: HOME | End: 2024-05-24
Attending: INTERNAL MEDICINE
Payer: MEDICARE

## 2024-05-21 VITALS — WEIGHT: 163 LBS | HEIGHT: 62 IN | BODY MASS INDEX: 30 KG/M2

## 2024-05-21 DIAGNOSIS — Z12.31 VISIT FOR SCREENING MAMMOGRAM: ICD-10-CM

## 2024-05-21 PROCEDURE — 77063 BREAST TOMOSYNTHESIS BI: CPT

## 2024-06-18 ENCOUNTER — TELEPHONE (OUTPATIENT)
Age: 79
End: 2024-06-18

## 2024-06-18 NOTE — TELEPHONE ENCOUNTER
PSR attempted to reach out to patient to schedule - patient kept picking up phone and it would disconnect

## 2024-06-18 NOTE — TELEPHONE ENCOUNTER
----- Message from Marjorie Braswell LPN sent at 6/18/2024  2:49 PM EDT -----  Regarding: FW: [facility] Appointment Request    ----- Message -----  From: Emanuel Grubbs  Sent: 6/18/2024   2:38 PM EDT  To: #  Subject: [facility] Appointment Request                          [facility] Appointment Request    Patient needs appointment for [facility] Appointment Type: Annual Visit.    Reason for Appointment Request: No appointments available during search  Additional information: PT requested an AWV on Sept. 30,2024 at 10:00am or anytime in the morning with Eugenio Portillo MD/ PCP.  --------------------------------------------------------------------------------------------------------------------------    Relationship to Patient: Self     Call Back Information: OK to leave message on voicemail  Preferred Call Back Number: Phone [phone] (home)

## 2024-06-28 ENCOUNTER — TELEPHONE (OUTPATIENT)
Age: 79
End: 2024-06-28

## 2024-06-28 NOTE — TELEPHONE ENCOUNTER
PSR reached out to patient to schedule - no answer, left detailed VM for call back   If patient returns call, please assist in scheduling if necessary

## 2024-06-28 NOTE — TELEPHONE ENCOUNTER
----- Message from Krystle Curran sent at 6/28/2024 12:54 PM EDT -----  Regarding: [facility] Appointment Request  [facility] Appointment Request    Patient needs appointment for E[facility] Appointment Type: Annual Visit.    Reason for Appointment Request: No appointments available during search -   notes: the patient waited for 2 weeks and already sent an appointment request 2 weeks ago  --------------------------------------------------------------------------------------------------------------------------    Relationship to Patient: Self     Call Back Information: OK to leave message on voicemail  Preferred Call Back Number: Phone [phone]

## 2024-09-30 ENCOUNTER — OFFICE VISIT (OUTPATIENT)
Age: 79
End: 2024-09-30
Payer: MEDICARE

## 2024-09-30 VITALS
SYSTOLIC BLOOD PRESSURE: 138 MMHG | TEMPERATURE: 98.1 F | HEIGHT: 62 IN | DIASTOLIC BLOOD PRESSURE: 62 MMHG | BODY MASS INDEX: 29.96 KG/M2 | WEIGHT: 162.8 LBS | RESPIRATION RATE: 16 BRPM | HEART RATE: 59 BPM

## 2024-09-30 DIAGNOSIS — K21.9 GASTRO-ESOPHAGEAL REFLUX DISEASE WITHOUT ESOPHAGITIS: ICD-10-CM

## 2024-09-30 DIAGNOSIS — C90.00 MULTIPLE MYELOMA NOT HAVING ACHIEVED REMISSION (HCC): ICD-10-CM

## 2024-09-30 DIAGNOSIS — E78.5 HYPERLIPIDEMIA LDL GOAL <100: ICD-10-CM

## 2024-09-30 DIAGNOSIS — Z00.00 MEDICARE ANNUAL WELLNESS VISIT, SUBSEQUENT: Primary | ICD-10-CM

## 2024-09-30 DIAGNOSIS — R53.83 OTHER FATIGUE: ICD-10-CM

## 2024-09-30 PROBLEM — H16.229 KERATOCONJUNCTIVITIS SICCA: Status: RESOLVED | Noted: 2017-12-27 | Resolved: 2024-09-30

## 2024-09-30 LAB
CHOLEST SERPL-MCNC: 210 MG/DL
HDLC SERPL-MCNC: 82 MG/DL
HDLC SERPL: 2.6 (ref 0–5)
LDLC SERPL CALC-MCNC: 112 MG/DL (ref 0–100)
TRIGL SERPL-MCNC: 80 MG/DL
TSH SERPL DL<=0.05 MIU/L-ACNC: 1.99 UIU/ML (ref 0.36–3.74)
VLDLC SERPL CALC-MCNC: 16 MG/DL

## 2024-09-30 PROCEDURE — 99213 OFFICE O/P EST LOW 20 MIN: CPT | Performed by: INTERNAL MEDICINE

## 2024-09-30 PROCEDURE — G8399 PT W/DXA RESULTS DOCUMENT: HCPCS | Performed by: INTERNAL MEDICINE

## 2024-09-30 PROCEDURE — 1123F ACP DISCUSS/DSCN MKR DOCD: CPT | Performed by: INTERNAL MEDICINE

## 2024-09-30 PROCEDURE — G0439 PPPS, SUBSEQ VISIT: HCPCS | Performed by: INTERNAL MEDICINE

## 2024-09-30 PROCEDURE — 1090F PRES/ABSN URINE INCON ASSESS: CPT | Performed by: INTERNAL MEDICINE

## 2024-09-30 PROCEDURE — G8428 CUR MEDS NOT DOCUMENT: HCPCS | Performed by: INTERNAL MEDICINE

## 2024-09-30 PROCEDURE — 1036F TOBACCO NON-USER: CPT | Performed by: INTERNAL MEDICINE

## 2024-09-30 PROCEDURE — G8419 CALC BMI OUT NRM PARAM NOF/U: HCPCS | Performed by: INTERNAL MEDICINE

## 2024-09-30 SDOH — ECONOMIC STABILITY: FOOD INSECURITY: WITHIN THE PAST 12 MONTHS, THE FOOD YOU BOUGHT JUST DIDN'T LAST AND YOU DIDN'T HAVE MONEY TO GET MORE.: NEVER TRUE

## 2024-09-30 SDOH — ECONOMIC STABILITY: FOOD INSECURITY: WITHIN THE PAST 12 MONTHS, YOU WORRIED THAT YOUR FOOD WOULD RUN OUT BEFORE YOU GOT MONEY TO BUY MORE.: NEVER TRUE

## 2024-09-30 SDOH — ECONOMIC STABILITY: INCOME INSECURITY: HOW HARD IS IT FOR YOU TO PAY FOR THE VERY BASICS LIKE FOOD, HOUSING, MEDICAL CARE, AND HEATING?: NOT HARD AT ALL

## 2024-09-30 ASSESSMENT — PATIENT HEALTH QUESTIONNAIRE - PHQ9
1. LITTLE INTEREST OR PLEASURE IN DOING THINGS: NOT AT ALL
SUM OF ALL RESPONSES TO PHQ QUESTIONS 1-9: 0
2. FEELING DOWN, DEPRESSED OR HOPELESS: NOT AT ALL
SUM OF ALL RESPONSES TO PHQ9 QUESTIONS 1 & 2: 0

## 2024-09-30 ASSESSMENT — LIFESTYLE VARIABLES
HOW MANY STANDARD DRINKS CONTAINING ALCOHOL DO YOU HAVE ON A TYPICAL DAY: PATIENT DOES NOT DRINK
HOW OFTEN DO YOU HAVE A DRINK CONTAINING ALCOHOL: NEVER

## 2024-09-30 NOTE — PATIENT INSTRUCTIONS
attack. These may include:    Chest pain or pressure, or a strange feeling in the chest.     Sweating.     Shortness of breath.     Pain, pressure, or a strange feeling in the back, neck, jaw, or upper belly or in one or both shoulders or arms.     Lightheadedness or sudden weakness.     A fast or irregular heartbeat.   After you call 911, the  may tell you to chew 1 adult-strength or 2 to 4 low-dose aspirin. Wait for an ambulance. Do not try to drive yourself.  Watch closely for changes in your health, and be sure to contact your doctor if you have any problems.  Where can you learn more?  Go to https://www.[employer].net/patientEd and enter F075 to learn more about \"A Healthy Heart: Care Instructions.\"  Current as of: June 24, 2023  Content Version: 14.1  © 0057-8166 [employer].   Care instructions adapted under license by "[employer]. If you have questions about a medical condition or this instruction, always ask your healthcare professional. [employer] disclaims any warranty or liability for your use of this information.      Personalized Preventive Plan for Radha Stein - 9/30/2024  Medicare offers a range of preventive health benefits. Some of the tests and screenings are paid in full while other may be subject to a deductible, co-insurance, and/or copay.    Some of these benefits include a comprehensive review of your medical history including lifestyle, illnesses that may run in your family, and various assessments and screenings as appropriate.    After reviewing your medical record and screening and assessments performed today your provider may have ordered immunizations, labs, imaging, and/or referrals for you.  A list of these orders (if applicable) as well as your Preventive Care list are included within your After Visit Summary for your review.    Other Preventive Recommendations:    A preventive eye exam performed by an eye specialist is recommended every 1-2

## 2024-09-30 NOTE — PROGRESS NOTES
\"Have you been to the ER, urgent care clinic since your last visit?  Hospitalized since your last visit?\"    NO    “Have you seen or consulted any other health care providers outside our system since your last visit?”    NO            
negative.         Objective   Physical Exam  Vitals and nursing note reviewed.   Constitutional:       Appearance: Normal appearance.   HENT:      Head: Normocephalic and atraumatic.      Right Ear: Tympanic membrane and external ear normal.      Left Ear: Tympanic membrane and external ear normal.      Nose: Nose normal.      Mouth/Throat:      Mouth: Mucous membranes are moist.   Eyes:      Extraocular Movements: Extraocular movements intact.      Conjunctiva/sclera: Conjunctivae normal.   Cardiovascular:      Rate and Rhythm: Normal rate and regular rhythm.   Pulmonary:      Effort: Pulmonary effort is normal.      Breath sounds: Normal breath sounds.   Abdominal:      General: Bowel sounds are normal.      Palpations: Abdomen is soft.   Musculoskeletal:         General: Normal range of motion.      Cervical back: Normal range of motion.   Skin:     General: Skin is warm.   Neurological:      General: No focal deficit present.      Mental Status: She is alert and oriented to person, place, and time. Mental status is at baseline.   Psychiatric:         Mood and Affect: Mood normal.         Behavior: Behavior normal.         Thought Content: Thought content normal.         Judgment: Judgment normal.            On this date 9/30/2024 I have spent 35 minutes reviewing previous notes, test results and face to face with the patient discussing the diagnosis and importance of compliance with the treatment plan as well as documenting on the day of the visit.    The patient (or guardian, if applicable) and other individuals in attendance with the patient were advised that Artificial Intelligence will be utilized during this visit to record, process the conversation to generate a clinical note, and support improvement of the AI technology. The patient (or guardian, if applicable) and other individuals in attendance at the appointment consented to the use of AI, including the recording.                 An electronic

## 2025-01-06 ENCOUNTER — TRANSCRIBE ORDERS (OUTPATIENT)
Facility: HOSPITAL | Age: 80
End: 2025-01-06

## 2025-01-06 DIAGNOSIS — Z12.31 OTHER SCREENING MAMMOGRAM: Primary | ICD-10-CM

## 2025-05-22 ENCOUNTER — TRANSCRIBE ORDERS (OUTPATIENT)
Facility: HOSPITAL | Age: 80
End: 2025-05-22

## 2025-05-22 ENCOUNTER — HOSPITAL ENCOUNTER (OUTPATIENT)
Facility: HOSPITAL | Age: 80
Discharge: HOME | End: 2025-05-25
Attending: INTERNAL MEDICINE
Payer: MEDICARE

## 2025-05-22 VITALS — BODY MASS INDEX: 29.63 KG/M2 | WEIGHT: 162 LBS

## 2025-05-22 DIAGNOSIS — Z12.31 VISIT FOR SCREENING MAMMOGRAM: Primary | ICD-10-CM

## 2025-05-22 DIAGNOSIS — Z12.31 OTHER SCREENING MAMMOGRAM: ICD-10-CM

## 2025-05-22 PROCEDURE — 77063 BREAST TOMOSYNTHESIS BI: CPT

## (undated) DEVICE — 3M™ CUROS™ DISINFECTING CAP FOR NEEDLELESS CONNECTORS 270/CARTON 20 CARTONS/CASE CFF1-270: Brand: CUROS™

## (undated) DEVICE — SOLIDIFIER MEDC 1200ML -- CONVERT TO 356117

## (undated) DEVICE — KENDALL RADIOLUCENT FOAM MONITORING ELECTRODE -RECTANGULAR SHAPE: Brand: KENDALL

## (undated) DEVICE — SET GRAV CK VLV NEEDLESS ST 3 GANGED 4WAY STPCOCK HI FLO 10

## (undated) DEVICE — CANN NASAL O2 CAPNOGRAPHY AD -- FILTERLINE

## (undated) DEVICE — KIT COLON W/ 1.1OZ LUB AND 2 END

## (undated) DEVICE — BASIN EMSIS 16OZ GRAPHITE PLAS KID SHP MOLD GRAD FOR ORAL

## (undated) DEVICE — SIMPLICITY FLUFF UNDERPAD 23X36, MODERATE: Brand: SIMPLICITY

## (undated) DEVICE — BAG BELONG PT PERS CLEAR HANDL

## (undated) DEVICE — BITEBLOCK ENDOSCP 60FR MAXI WHT POLYETH STURDY W/ VELC WVN

## (undated) DEVICE — CATH IV AUTOGRD BC BLU 22GA 25 -- INSYTE

## (undated) DEVICE — Device

## (undated) DEVICE — 1200 GUARD II KIT W/5MM TUBE W/O VAC TUBE: Brand: GUARDIAN